# Patient Record
Sex: FEMALE | Race: WHITE | Employment: OTHER | URBAN - METROPOLITAN AREA
[De-identification: names, ages, dates, MRNs, and addresses within clinical notes are randomized per-mention and may not be internally consistent; named-entity substitution may affect disease eponyms.]

---

## 2017-04-28 ENCOUNTER — TRANSFERRED RECORDS (OUTPATIENT)
Dept: HEALTH INFORMATION MANAGEMENT | Facility: CLINIC | Age: 57
End: 2017-04-28

## 2017-09-18 ENCOUNTER — OFFICE VISIT (OUTPATIENT)
Dept: FAMILY MEDICINE | Facility: CLINIC | Age: 57
End: 2017-09-18
Payer: COMMERCIAL

## 2017-09-18 VITALS
WEIGHT: 208.4 LBS | SYSTOLIC BLOOD PRESSURE: 131 MMHG | HEIGHT: 68 IN | HEART RATE: 72 BPM | TEMPERATURE: 98.2 F | BODY MASS INDEX: 31.58 KG/M2 | OXYGEN SATURATION: 97 % | DIASTOLIC BLOOD PRESSURE: 89 MMHG

## 2017-09-18 DIAGNOSIS — E11.9 TYPE 2 DIABETES MELLITUS WITHOUT COMPLICATION, WITHOUT LONG-TERM CURRENT USE OF INSULIN (H): Primary | ICD-10-CM

## 2017-09-18 DIAGNOSIS — K63.5 POLYP OF COLON, UNSPECIFIED PART OF COLON, UNSPECIFIED TYPE: ICD-10-CM

## 2017-09-18 DIAGNOSIS — E78.5 HYPERLIPIDEMIA LDL GOAL <100: ICD-10-CM

## 2017-09-18 LAB
ALBUMIN SERPL-MCNC: 4 G/DL (ref 3.4–5)
ALP SERPL-CCNC: 76 U/L (ref 40–150)
ALT SERPL W P-5'-P-CCNC: 32 U/L (ref 0–50)
ANION GAP SERPL CALCULATED.3IONS-SCNC: 11 MMOL/L (ref 3–14)
AST SERPL W P-5'-P-CCNC: 18 U/L (ref 0–45)
BILIRUB SERPL-MCNC: 0.4 MG/DL (ref 0.2–1.3)
BUN SERPL-MCNC: 14 MG/DL (ref 7–30)
CALCIUM SERPL-MCNC: 9.2 MG/DL (ref 8.5–10.1)
CHLORIDE SERPL-SCNC: 106 MMOL/L (ref 94–109)
CHOLEST SERPL-MCNC: 230 MG/DL
CO2 SERPL-SCNC: 25 MMOL/L (ref 20–32)
CREAT SERPL-MCNC: 0.66 MG/DL (ref 0.52–1.04)
CREAT UR-MCNC: 153 MG/DL
GFR SERPL CREATININE-BSD FRML MDRD: >90 ML/MIN/1.7M2
GLUCOSE SERPL-MCNC: 114 MG/DL (ref 70–99)
HBA1C MFR BLD: 6 % (ref 4.3–6)
HDLC SERPL-MCNC: 48 MG/DL
LDLC SERPL CALC-MCNC: 142 MG/DL
MICROALBUMIN UR-MCNC: 12 MG/L
MICROALBUMIN/CREAT UR: 7.78 MG/G CR (ref 0–25)
NONHDLC SERPL-MCNC: 182 MG/DL
POTASSIUM SERPL-SCNC: 4.4 MMOL/L (ref 3.4–5.3)
PROT SERPL-MCNC: 7.3 G/DL (ref 6.8–8.8)
SODIUM SERPL-SCNC: 142 MMOL/L (ref 133–144)
TRIGL SERPL-MCNC: 200 MG/DL
TSH SERPL DL<=0.005 MIU/L-ACNC: 2.21 MU/L (ref 0.4–4)

## 2017-09-18 PROCEDURE — 99214 OFFICE O/P EST MOD 30 MIN: CPT | Performed by: FAMILY MEDICINE

## 2017-09-18 PROCEDURE — 80053 COMPREHEN METABOLIC PANEL: CPT | Performed by: FAMILY MEDICINE

## 2017-09-18 PROCEDURE — 83036 HEMOGLOBIN GLYCOSYLATED A1C: CPT | Performed by: FAMILY MEDICINE

## 2017-09-18 PROCEDURE — 36415 COLL VENOUS BLD VENIPUNCTURE: CPT | Performed by: FAMILY MEDICINE

## 2017-09-18 PROCEDURE — 82043 UR ALBUMIN QUANTITATIVE: CPT | Performed by: FAMILY MEDICINE

## 2017-09-18 PROCEDURE — 80061 LIPID PANEL: CPT | Performed by: FAMILY MEDICINE

## 2017-09-18 PROCEDURE — 84443 ASSAY THYROID STIM HORMONE: CPT | Performed by: FAMILY MEDICINE

## 2017-09-18 NOTE — LETTER
September 20, 2017      Vannessa Moyer  [address] [facility] [address]        Dear ,        I have reviewed your recent labs. Here are the results:     -Liver and gallbladder tests (ALT,AST, Alk phos,bilirubin) are normal.   -Kidney function (GFR) is normal.   -Sodium is normal.   -Potassium is normal.       -LDL(bad) cholesterol level is elevated, HDL(good) cholesterol level is low and your triglycerides are elevated which can increase your heart disease risk.  A diet high in fat and simple carbohydrates, genetics and being overweight can contribute to this. ADVISE: Exercise, a low fat, low carbohydrate diet, weight control, and omega-3 fatty acids (fish oil) 1615-8158 mg daily are helpful to improve this. Rechecking your fasting cholesterol panel in 6 months is recommended.    LDL is above goal, will send low dose of cholesterol medication called Lipitor 10 mg. Please start taking along with metformin.     -TSH (thyroid stimulating hormone) level is normal which indicates normal thyroid function.     -A1C (test of diabetes control the last 2-3 months) is at your goal. Please continue with current plan. Also, see me and recheck your A1C test in 6 months.     -Microalbumin (urine protein) test is normal.  ADVISE: recheck annually     Resulted Orders   Lipid panel reflex to direct LDL   Result Value Ref Range    Cholesterol 230 (H) <200 mg/dL      Comment:      Desirable:       <200 mg/dl    Triglycerides 200 (H) <150 mg/dL      Comment:      Borderline high:  150-199 mg/dl  High:             200-499 mg/dl  Very high:       >499 mg/dl  Fasting specimen      HDL Cholesterol 48 (L) >49 mg/dL    LDL Cholesterol Calculated 142 (H) <100 mg/dL      Comment:      Above desirable:  100-129 mg/dl  Borderline High:  130-159 mg/dL  High:             160-189 mg/dL  Very high:       >189 mg/dl      Non HDL Cholesterol 182 (H) <130 mg/dL      Comment:      Above Desirable:  130-159 mg/dl  Borderline high:   160-189 mg/dl  High:             190-219 mg/dl  Very high:       >219 mg/dl     Hemoglobin A1c   Result Value Ref Range    Hemoglobin A1C 6.0 4.3 - 6.0 %   Comprehensive metabolic panel   Result Value Ref Range    Sodium 142 133 - 144 mmol/L    Potassium 4.4 3.4 - 5.3 mmol/L    Chloride 106 94 - 109 mmol/L    Carbon Dioxide 25 20 - 32 mmol/L    Anion Gap 11 3 - 14 mmol/L    Glucose 114 (H) 70 - 99 mg/dL      Comment:      Fasting specimen    Urea Nitrogen 14 7 - 30 mg/dL    Creatinine 0.66 0.52 - 1.04 mg/dL    GFR Estimate >90 >60 mL/min/1.7m2      Comment:      Non  GFR Calc    GFR Estimate If Black >90 >60 mL/min/1.7m2      Comment:       GFR Calc    Calcium 9.2 8.5 - 10.1 mg/dL    Bilirubin Total 0.4 0.2 - 1.3 mg/dL    Albumin 4.0 3.4 - 5.0 g/dL    Protein Total 7.3 6.8 - 8.8 g/dL    Alkaline Phosphatase 76 40 - 150 U/L    ALT 32 0 - 50 U/L    AST 18 0 - 45 U/L   TSH   Result Value Ref Range    TSH 2.21 0.40 - 4.00 mU/L   Albumin Random Urine Quantitative with Creat Ratio   Result Value Ref Range    Creatinine Urine 153 mg/dL    Albumin Urine mg/L 12 mg/L    Albumin Urine mg/g Cr 7.78 0 - 25 mg/g Cr       If you have any questions or concerns, please call the clinic at the number listed above.       Sincerely,    Tesfaye Mason MD

## 2017-09-18 NOTE — PROGRESS NOTES
SUBJECTIVE:   Vannessa Moyer is a 57 year old female who presents to clinic today for the following health issues:      Diabetes Follow-up    Patient is checking blood sugars: once daily.  Results are as follows:       am - [phone]  She has been busy at her work, not doing regular exercise.  No other symptoms, no chest pain or shortness of breath.        Diabetic concerns: None     Symptoms of hypoglycemia (low blood sugar): none     Paresthesias (numbness or burning in feet) or sores: No   Date of last diabetic eye exam: pt is due       Amount of exercise or physical activity: 2-3 days/week for an average of 45-60 minutes    Problems taking medications regularly: No    Medication side effects: none  Diet: carbohydrate counting          Problem list and histories reviewed & adjusted, as indicated.  Additional history: as documented    Patient Active Problem List   Diagnosis     Pernicious anemia     Chronic rhinitis     Colon polyps     Hyperlipidemia LDL goal <100     Type 2 diabetes mellitus without complication, without long-term current use of insulin (H)     Past Surgical History:   Procedure Laterality Date     C LASIK (EMP) W OPT MYOPIA -6.50  2000     COLONOSCOPY  4/2014    4 polyps, repeat in 3 years     HC REMOVAL OF TONSILS,<12 Y/O  1970    Tonsils <13y.o.     HC REPAIR OF HAMMERTOE,ONE  12/2005       Social History   Substance Use Topics     Smoking status: Never Smoker     Smokeless tobacco: Never Used     Alcohol use 0.0 oz/week     0 Standard drinks or equivalent per week     Family History   Problem Relation Age of Onset     Neurologic Disorder Mother      parkinsons     OSTEOPOROSIS Mother      Thyroid Disease Mother      CEREBROVASCULAR DISEASE Father      C.A.D. Father      quadruple bypass     Hypertension Father      CEREBROVASCULAR DISEASE Maternal Grandmother      CEREBROVASCULAR DISEASE Paternal Grandmother      Circulatory Paternal Grandmother      CEREBROVASCULAR DISEASE Paternal  "Grandfather      Hypertension Paternal Grandfather      CEREBROVASCULAR DISEASE Brother      passed away from [facility]     DIABETES Brother      Thyroid Disease Sister          Current Outpatient Prescriptions   Medication Sig Dispense Refill     metFORMIN (GLUCOPHAGE) 500 MG tablet Take 1 tablet (500 mg) by mouth 2 times daily (with meals) 180 tablet 3     blood glucose monitoring (BRIT CONTOUR NEXT) test strip by In Vitro route 3 times daily Use to test blood sugar 3 times daily or as directed. 100 each 3     blood glucose monitoring (BRIT MICROLET) lancets Use to test blood sugar 3 times daily or as directed. 1 Box prn     blood glucose monitoring (BRIT CONTOUR NEXT USB MONITOR) meter device kit Use to test blood sugar 2  times daily or as directed. 1 kit 0     ACE NOT PRESCRIBED, INTENTIONAL, 1 each as needed for other ACE Inhibitor not prescribed due to Other:BP is normal Microalbumin is normal 1 each 0     aspirin 81 MG tablet Take 1 tablet (81 mg) by mouth daily 30 tablet 11     MULTIVITAMIN TABS   OR 1 tablet daily 0 0     CALCIUM 600 MG OR TABS bid 0 0     FISH OIL CONCENTRATE 1000 MG OR CAPS 1 tablet daily 0 0     [DISCONTINUED] metFORMIN (GLUCOPHAGE) 500 MG tablet Take 1 tablet (500 mg) by mouth 2 times daily (with meals) 180 tablet 3         Reviewed and updated as needed this visit by clinical staffTobacco  Allergies  Meds  Med Hx  Surg Hx  Fam Hx  Soc Hx      Reviewed and updated as needed this visit by Provider         ROS:  C: NEGATIVE for fever, chills, change in weight  E/M: NEGATIVE for ear, mouth and throat problems  R: NEGATIVE for significant cough or SOB  CV: NEGATIVE for chest pain, palpitations or peripheral edema    OBJECTIVE:                                                    /89 (BP Location: Right arm, Patient Position: Sitting, Cuff Size: Adult Large)  Pulse 72  Temp 98.2  F (36.8  C) (Tympanic)  Ht 5' 8\" (1.727 m)  Wt 208 lb 6.4 oz (94.5 kg)  SpO2 97%  BMI 31.69 " kg/m2  Body mass index is 31.69 kg/(m^2).   GENERAL: healthy, alert, well nourished, well hydrated, no distress  HENT: ear canals- normal; TMs- normal; Nose- normal; Mouth- no ulcers, no lesions  NECK: no tenderness, no adenopathy, no asymmetry, no masses, no stiffness; thyroid- normal to palpation  RESP: lungs clear to auscultation - no rales, no rhonchi, no wheezes  CV: regular rates and rhythm, normal S1 S2, no S3 or S4 and no murmur, no click or rub -  ABDOMEN: soft, no tenderness, no  hepatosplenomegaly, no masses, normal bowel sounds       ASSESSMENT/PLAN:                                                        ICD-10-CM    1. Type 2 diabetes mellitus without complication, without long-term current use of insulin (H) E11.9 Hemoglobin A1c     Comprehensive metabolic panel     TSH     Albumin Random Urine Quantitative with Creat Ratio     metFORMIN (GLUCOPHAGE) 500 MG tablet   2. Hyperlipidemia LDL goal <100 E78.5 Lipid panel reflex to direct LDL     Comprehensive metabolic panel   3. Polyp of colon, unspecified part of colon, unspecified type K63.5        Patient AIC is stable, we will refill the medications. Labs ordered will follow up on that.  Advice healthy diet and regular exercise.  BP is stable.  Follow up in [id number] for physical.    Tesfaye Mason MD  [facility]

## 2017-09-18 NOTE — NURSING NOTE
"Chief Complaint   Patient presents with     Diabetes       Initial There were no vitals taken for this visit. Estimated body mass index is 30.87 kg/(m^2) as calculated from the following:    Height as of 10/7/16: 5' 8\" (1.727 m).    Weight as of 10/7/16: 203 lb (92.1 kg).  Medication Reconciliation: complete   Lanette Guerrero CMA    "

## 2017-09-18 NOTE — MR AVS SNAPSHOT
After Visit Summary   9/18/2017    Vannessa Moyer    MRN: [id number]           Patient Information     Date Of Birth          1960        Visit Information        Provider Department      9/18/2017 9:00 AM Tesfaye Mason MD [facility] [place]        Today's Diagnoses     Type 2 diabetes mellitus without complication, without long-term current use of insulin (H)    -  1    Hyperlipidemia LDL goal <100        Polyp of colon, unspecified part of colon, unspecified type           Follow-ups after your visit        Who to contact     If you have questions or need follow up information about today's clinic visit or your schedule please contact [facility] [place] directly at [phone].  Normal or non-critical lab and imaging results will be communicated to you by Eye Phonehart, letter or phone within 4 business days after the clinic has received the results. If you do not hear from us within 7 days, please contact the clinic through Eye Phonehart or phone. If you have a critical or abnormal lab result, we will notify you by phone as soon as possible.  Submit refill requests through [employer] or call your pharmacy and they will forward the refill request to us. Please allow 3 business days for your refill to be completed.          Additional Information About Your Visit        MyChart Information     [employer] gives you secure access to your electronic health record. If you see a primary care provider, you can also send messages to your care team and make appointments. If you have questions, please call your primary care clinic.  If you do not have a primary care provider, please call [phone] and they will assist you.        Care EveryWhere ID     This is your Care EveryWhere ID. This could be used by other organizations to access your [facility] medical records  [id number]        Your Vitals Were     Pulse Temperature Height Pulse Oximetry BMI (Body Mass Index)       72 98.2  F (36.8  C)  "(Tympanic) 5' 8\" (1.727 m) 97% 31.69 kg/m2        Blood Pressure from Last 3 Encounters:   09/18/17 131/89   10/07/16 122/68   04/05/16 126/72    Weight from Last 3 Encounters:   09/18/17 208 lb 6.4 oz (94.5 kg)   10/07/16 203 lb (92.1 kg)   04/05/16 186 lb (84.4 kg)              We Performed the Following     Albumin Random Urine Quantitative with Creat Ratio     Comprehensive metabolic panel     Hemoglobin A1c     Lipid panel reflex to direct LDL     TSH          Where to get your medicines      These medications were sent to [facility] - [place], [place] - [address] AT [facility]  [address] [place] [address], [facility] [address]     Phone:  [phone]     metFORMIN 500 MG tablet          Primary Care Provider Office Phone # Fax #    Tesfaye Mason [phone]       [address]  [place] [address]        Equal Access to Services     [facility]: Hadii aad ku hadasho Soomaali, waaxda luqadaha, qaybta kaalmada adeegyada, waxay sadiein hayliane turcios . So [facility] [phone].    ATENCIÓN: Si habla español, tiene a multani disposición servicios gratuitos de asistencia lingüística. Llame al [phone].    We comply with applicable federal civil rights laws and [place] laws. We do not discriminate on the basis of race, color, national origin, age, disability sex, sexual orientation or gender identity.            Thank you!     Thank you for choosing [facility] [place]  for your care. Our goal is always to provide you with excellent care. Hearing back from our patients is one way we can continue to improve our services. Please take a few minutes to complete the written survey that you may receive in the mail after your visit with us. Thank you!             Your Updated Medication List - Protect others around you: Learn how to safely use, store and throw away your medicines at www.disposemymeds.org.          This list is accurate as of: " 9/18/17  9:59 AM.  Always use your most recent med list.                   Brand Name Dispense Instructions for use Diagnosis    ACE NOT PRESCRIBED (INTENTIONAL)     1 each    1 each as needed for other ACE Inhibitor not prescribed due to Other:BP is normal Microalbumin is normal    Diabetes mellitus, type 2 (H)       aspirin 81 MG tablet     30 tablet    Take 1 tablet (81 mg) by mouth daily    CARDIOVASCULAR SCREENING; LDL GOAL LESS THAN 130, Elevated blood sugar, Diabetes mellitus, type 2 (H)       blood glucose monitoring lancets     1 Box    Use to test blood sugar 3 times daily or as directed.    Type 2 diabetes mellitus without complications (H)       blood glucose monitoring meter device kit     1 kit    Use to test blood sugar 2  times daily or as directed.    Diabetes mellitus, type 2 (H)       blood glucose monitoring test strip    BRIT CONTOUR NEXT    100 each    by In Vitro route 3 times daily Use to test blood sugar 3 times daily or as directed.    Type 2 diabetes mellitus without complications (H)       calcium 600 MG tablet     0    bid        FISH OIL CONCENTRATE 1000 MG Caps     0    1 tablet daily        metFORMIN 500 MG tablet    GLUCOPHAGE    180 tablet    Take 1 tablet (500 mg) by mouth 2 times daily (with meals)    Type 2 diabetes mellitus without complication, without long-term current use of insulin (H)       MULTIVITAMIN TABS   OR     0    1 tablet daily

## 2017-09-19 RX ORDER — ATORVASTATIN CALCIUM 10 MG/1
10 TABLET, FILM COATED ORAL DAILY
Qty: 90 TABLET | Refills: 3 | Status: SHIPPED | OUTPATIENT
Start: 2017-09-19 | End: 2018-08-21

## 2018-04-16 ENCOUNTER — TRANSFERRED RECORDS (OUTPATIENT)
Dept: HEALTH INFORMATION MANAGEMENT | Facility: CLINIC | Age: 58
End: 2018-04-16

## 2018-08-06 DIAGNOSIS — E78.5 HYPERLIPIDEMIA LDL GOAL <100: ICD-10-CM

## 2018-08-06 DIAGNOSIS — E11.9 TYPE 2 DIABETES MELLITUS WITHOUT COMPLICATION, WITHOUT LONG-TERM CURRENT USE OF INSULIN (H): ICD-10-CM

## 2018-08-06 RX ORDER — ATORVASTATIN CALCIUM 10 MG/1
10 TABLET, FILM COATED ORAL DAILY
Qty: 90 TABLET | Refills: 3 | Status: CANCELLED | OUTPATIENT
Start: 2018-08-06

## 2018-08-06 NOTE — TELEPHONE ENCOUNTER
Patient is overdue for DM visit.  She read her [employer] message sent by Dona on 5/15/18 but has not scheduled.  Is she still a [facility] patient?    Left message for patient to call triage back  Joyce Madlonado RN- Triage [employer]

## 2018-08-06 NOTE — TELEPHONE ENCOUNTER
"Requested Prescriptions   Pending Prescriptions Disp Refills     metFORMIN (GLUCOPHAGE) 500 MG tablet  Last Written Prescription Date:  9/18/17  Last Fill Quantity: 180,  # refills: 3   Last office visit: 9/18/2017 with prescribing provider:  Marlon   Future Office Visit:     180 tablet 3     Sig: Take 1 tablet (500 mg) by mouth 2 times daily (with meals)    Biguanide Agents Failed    8/6/2018 11:36 AM       Failed - Blood pressure less than 140/90 in past 6 months    BP Readings from Last 3 Encounters:   09/18/17 131/89   10/07/16 122/68   04/05/16 126/72                Failed - Patient has documented A1c within the specified period of time.    If HgbA1C is 8 or greater, it needs to be on file within the past 3 months.  If less than 8, must be on file within the past 6 months.     Recent Labs   Lab Test  09/18/17   0937   A1C  6.0            Failed - Recent (6 mo) or future (30 days) visit within the authorizing provider's specialty    Patient had office visit in the last 6 months or has a visit in the next 30 days with authorizing provider or within the authorizing provider's specialty.  See \"Patient Info\" tab in inbasket, or \"Choose Columns\" in Meds & Orders section of the refill encounter.           Passed - Patient has documented LDL within the past 12 mos.    Recent Labs   Lab Test  09/18/17   0937   LDL  142*            Passed - Patient has had a Microalbumin in the past 12 mos.    Recent Labs   Lab Test  09/18/17   0945   MICROL  12   UMALCR  7.78            Passed - Patient is age 10 or older       Passed - Patient's CR is NOT>1.4 OR Patient's EGFR is NOT<45 within past 12 mos.    Recent Labs   Lab Test  09/18/17   0937   GFRESTIMATED  >90   GFRESTBLACK  >90       Recent Labs   Lab Test  09/18/17   0937   CR  0.66            Passed - Patient does NOT have a diagnosis of CHF.       Passed - Patient is not pregnant       Passed - Patient has not had a positive pregnancy test within the past 12 mos.         " "atorvastatin (LIPITOR) 10 MG tablet  Last Written Prescription Date:  9/19/17  Last Fill Quantity: 90,  # refills: 3   Last office visit: 9/18/2017 with prescribing provider:  Marlon   Future Office Visit:     90 tablet 3     Sig: Take 1 tablet (10 mg) by mouth daily    Statins Protocol Passed    8/6/2018 11:36 AM       Passed - LDL on file in past 12 months    Recent Labs   Lab Test  09/18/17   0937   LDL  142*            Passed - No abnormal creatine kinase in past 12 months    No lab results found.            Passed - Recent (12 mo) or future (30 days) visit within the authorizing provider's specialty    Patient had office visit in the last 12 months or has a visit in the next 30 days with authorizing provider or within the authorizing provider's specialty.  See \"Patient Info\" tab in inbasket, or \"Choose Columns\" in Meds & Orders section of the refill encounter.           Passed - Patient is age 18 or older       Passed - No active pregnancy on record       Passed - No positive pregnancy test in past 12 months          "

## 2018-08-07 NOTE — TELEPHONE ENCOUNTER
Spoke with patient and OV was advised. She scheduled appointment on 8/21/18. She states she does not need a refill on atorvastatin but needs a refill of metformin. This was sent into the pharmacy for her.   Edie Page RN   [facility] - Triage

## 2018-08-09 NOTE — TELEPHONE ENCOUNTER
[place] pharmacy calling to request verbal okay for Metformin as they did not received fax.  Verbal order given over the phone.    JAY Merrill, RN, PHN  [facility]) [phone]

## 2018-08-13 ENCOUNTER — TELEPHONE (OUTPATIENT)
Dept: FAMILY MEDICINE | Facility: CLINIC | Age: 58
End: 2018-08-13

## 2018-08-13 DIAGNOSIS — E11.9 TYPE 2 DIABETES MELLITUS WITHOUT COMPLICATION, WITHOUT LONG-TERM CURRENT USE OF INSULIN (H): ICD-10-CM

## 2018-08-13 NOTE — TELEPHONE ENCOUNTER
Reason for Call: prescription    Detailed comments: Pt called and request the metFORMIN (GLUCOPHAGE) 500 MG tablet to be send to [facility] due to Ins coverage, pls call pt when it is ready     Phone Number Patient can be reached at: Cell number on file:    Telephone Information:   Mobile [phone]       Best Time: anytime     Can we leave a detailed message on this number? YES    Call taken on 8/13/2018 at 10:11 AM by CASEY MYLES

## 2018-08-13 NOTE — TELEPHONE ENCOUNTER
Patient wants script sent here.  Previous pharmacy did not take insurance.  Teresa Pantoja RN - Triage  [facility]

## 2018-08-21 ENCOUNTER — OFFICE VISIT (OUTPATIENT)
Dept: FAMILY MEDICINE | Facility: CLINIC | Age: 58
End: 2018-08-21
Payer: COMMERCIAL

## 2018-08-21 VITALS
WEIGHT: 211 LBS | SYSTOLIC BLOOD PRESSURE: 130 MMHG | BODY MASS INDEX: 31.98 KG/M2 | HEART RATE: 70 BPM | DIASTOLIC BLOOD PRESSURE: 80 MMHG | HEIGHT: 68 IN | TEMPERATURE: 96.5 F

## 2018-08-21 DIAGNOSIS — E78.5 HYPERLIPIDEMIA LDL GOAL <100: ICD-10-CM

## 2018-08-21 DIAGNOSIS — E11.9 TYPE 2 DIABETES MELLITUS WITHOUT COMPLICATION, WITHOUT LONG-TERM CURRENT USE OF INSULIN (H): Primary | ICD-10-CM

## 2018-08-21 DIAGNOSIS — L98.9 SKIN LESION: ICD-10-CM

## 2018-08-21 LAB
ALT SERPL W P-5'-P-CCNC: 46 U/L (ref 0–50)
ANION GAP SERPL CALCULATED.3IONS-SCNC: 9 MMOL/L (ref 3–14)
AST SERPL W P-5'-P-CCNC: 27 U/L (ref 0–45)
BUN SERPL-MCNC: 15 MG/DL (ref 7–30)
CALCIUM SERPL-MCNC: 9.3 MG/DL (ref 8.5–10.1)
CHLORIDE SERPL-SCNC: 106 MMOL/L (ref 94–109)
CHOLEST SERPL-MCNC: 164 MG/DL
CO2 SERPL-SCNC: 27 MMOL/L (ref 20–32)
CREAT SERPL-MCNC: 0.66 MG/DL (ref 0.52–1.04)
CREAT UR-MCNC: 122 MG/DL
GFR SERPL CREATININE-BSD FRML MDRD: >90 ML/MIN/1.7M2
GLUCOSE SERPL-MCNC: 145 MG/DL (ref 70–99)
HBA1C MFR BLD: 6.9 % (ref 0–5.6)
HDLC SERPL-MCNC: 41 MG/DL
LDLC SERPL CALC-MCNC: 84 MG/DL
MICROALBUMIN UR-MCNC: 10 MG/L
MICROALBUMIN/CREAT UR: 8.44 MG/G CR (ref 0–25)
NONHDLC SERPL-MCNC: 123 MG/DL
POTASSIUM SERPL-SCNC: 4.7 MMOL/L (ref 3.4–5.3)
SODIUM SERPL-SCNC: 142 MMOL/L (ref 133–144)
TRIGL SERPL-MCNC: 197 MG/DL

## 2018-08-21 PROCEDURE — 99214 OFFICE O/P EST MOD 30 MIN: CPT | Performed by: INTERNAL MEDICINE

## 2018-08-21 PROCEDURE — 80061 LIPID PANEL: CPT | Performed by: INTERNAL MEDICINE

## 2018-08-21 PROCEDURE — 84460 ALANINE AMINO (ALT) (SGPT): CPT | Performed by: INTERNAL MEDICINE

## 2018-08-21 PROCEDURE — 84450 TRANSFERASE (AST) (SGOT): CPT | Performed by: INTERNAL MEDICINE

## 2018-08-21 PROCEDURE — 99207 C FOOT EXAM  NO CHARGE: CPT | Performed by: INTERNAL MEDICINE

## 2018-08-21 PROCEDURE — 36415 COLL VENOUS BLD VENIPUNCTURE: CPT | Performed by: INTERNAL MEDICINE

## 2018-08-21 PROCEDURE — 82043 UR ALBUMIN QUANTITATIVE: CPT | Performed by: INTERNAL MEDICINE

## 2018-08-21 PROCEDURE — 80048 BASIC METABOLIC PNL TOTAL CA: CPT | Performed by: INTERNAL MEDICINE

## 2018-08-21 PROCEDURE — 83036 HEMOGLOBIN GLYCOSYLATED A1C: CPT | Performed by: INTERNAL MEDICINE

## 2018-08-21 RX ORDER — ATORVASTATIN CALCIUM 10 MG/1
10 TABLET, FILM COATED ORAL DAILY
Qty: 90 TABLET | Refills: 3 | Status: SHIPPED | OUTPATIENT
Start: 2018-08-21 | End: 2019-09-03

## 2018-08-21 RX ORDER — BETAMETHASONE DIPROPIONATE 0.5 MG/G
CREAM TOPICAL
Qty: 15 G | Refills: 1 | Status: SHIPPED | OUTPATIENT
Start: 2018-08-21

## 2018-08-21 NOTE — MR AVS SNAPSHOT
After Visit Summary   8/21/2018    Vannessa Moyer    MRN: [id number]           Patient Information     Date Of Birth          1960        Visit Information        Provider Department      8/21/2018 9:00 AM Chel Seals MD [facility] [place]        Today's Diagnoses     Type 2 diabetes mellitus without complication, without long-term current use of insulin (H)    -  1    Hyperlipidemia LDL goal <100           Follow-ups after your visit        Follow-up notes from your care team     Return in about 6 months (around 2/21/2019) for Routine Visit.      Who to contact     If you have questions or need follow up information about today's clinic visit or your schedule please contact [facility] [place] directly at [phone].  Normal or non-critical lab and imaging results will be communicated to you by MyChart, letter or phone within 4 business days after the clinic has received the results. If you do not hear from us within 7 days, please contact the clinic through MyChart or phone. If you have a critical or abnormal lab result, we will notify you by phone as soon as possible.  Submit refill requests through [employer] or call your pharmacy and they will forward the refill request to us. Please allow 3 business days for your refill to be completed.          Additional Information About Your Visit        MyChart Information     [employer] gives you secure access to your electronic health record. If you see a primary care provider, you can also send messages to your care team and make appointments. If you have questions, please call your primary care clinic.  If you do not have a primary care provider, please call [phone] and they will assist you.        Care EveryWhere ID     This is your Care EveryWhere ID. This could be used by other organizations to access your [facility] medical records  [id number]        Your Vitals Were     Pulse Temperature Height BMI (Body Mass  "Index)          70 96.5  F (35.8  C) (Tympanic) 5' 8\" (1.727 m) 32.08 kg/m2         Blood Pressure from Last 3 Encounters:   08/21/18 130/80   09/18/17 131/89   10/07/16 122/68    Weight from Last 3 Encounters:   08/21/18 211 lb (95.7 kg)   09/18/17 208 lb 6.4 oz (94.5 kg)   10/07/16 203 lb (92.1 kg)              We Performed the Following     Albumin Random Urine Quantitative with Creat Ratio     Basic metabolic panel  (Ca, Cl, CO2, Creat, Gluc, K, Na, BUN)     Hemoglobin A1c     Lipid panel reflex to direct LDL Fasting          Today's Medication Changes          These changes are accurate as of 8/21/18  9:26 AM.  If you have any questions, ask your nurse or doctor.               These medicines have changed or have updated prescriptions.        Dose/Directions    metFORMIN 1000 MG tablet   Commonly known as:  GLUCOPHAGE   This may have changed:    - medication strength  - how much to take   Used for:  Type 2 diabetes mellitus without complication, without long-term current use of insulin (H)   Changed by:  Chel Seals MD        Dose:  1000 mg   Take 1 tablet (1,000 mg) by mouth 2 times daily (with meals)   Quantity:  180 tablet   Refills:  1            Where to get your medicines      These medications were sent to [id number] - [place] - [address]  [address] [place] [address]     Phone:  [phone]     atorvastatin 10 MG tablet    metFORMIN 1000 MG tablet                Primary Care Provider Office Phone # Fax #    Tesfaye Mason [phone]       [address]  [place] [address]        Equal Access to Services     [facility] ESTRADA AH: Hadii scott Sutton, wagabrielda luqadaha, qaybta kaalmada brandi, kim allan. So [facility] [place] [phone].    ATENCIÓN: Si habla español, tiene a multani disposición servicios gratuitos de asistencia lingüística. Llame al [phone].    We comply with applicable federal " civil rights laws and [place] laws. We do not discriminate on the basis of race, color, national origin, age, disability, sex, sexual orientation, or gender identity.            Thank you!     Thank you for choosing [facility] [place]  for your care. Our goal is always to provide you with excellent care. Hearing back from our patients is one way we can continue to improve our services. Please take a few minutes to complete the written survey that you may receive in the mail after your visit with us. Thank you!             Your Updated Medication List - Protect others around you: Learn how to safely use, store and throw away your medicines at www.disposemymeds.org.          This list is accurate as of 8/21/18  9:26 AM.  Always use your most recent med list.                   Brand Name Dispense Instructions for use Diagnosis    ACE NOT PRESCRIBED (INTENTIONAL)     1 each    1 each as needed for other ACE Inhibitor not prescribed due to Other:BP is normal Microalbumin is normal    Diabetes mellitus, type 2 (H)       aspirin 81 MG tablet     30 tablet    Take 1 tablet (81 mg) by mouth daily    CARDIOVASCULAR SCREENING; LDL GOAL LESS THAN 130, Elevated blood sugar, Diabetes mellitus, type 2 (H)       atorvastatin 10 MG tablet    LIPITOR    90 tablet    Take 1 tablet (10 mg) by mouth daily    Hyperlipidemia LDL goal <100       blood glucose monitoring lancets     100 each    Use to test blood sugar 3 times daily or as directed.    Type 2 diabetes mellitus without complications (H)       blood glucose monitoring meter device kit     1 kit    Use to test blood sugar 2  times daily or as directed.    Diabetes mellitus, type 2 (H)       blood glucose monitoring test strip    BRIT CONTOUR NEXT    100 each    100 strips by In Vitro route 3 times daily Use to test blood sugar 3 times daily or as directed.    Type 2 diabetes mellitus without complication, without long-term current use of insulin (H)       calcium  600 MG tablet     0    bid        FISH OIL CONCENTRATE 1000 MG Caps     0    1 tablet daily        metFORMIN 1000 MG tablet    GLUCOPHAGE    180 tablet    Take 1 tablet (1,000 mg) by mouth 2 times daily (with meals)    Type 2 diabetes mellitus without complication, without long-term current use of insulin (H)       MULTIVITAMIN TABS   OR     0    1 tablet daily        NONFORMULARY

## 2018-08-21 NOTE — PROGRESS NOTES
"  SUBJECTIVE:   Vannessa Moyer is a 58 year old female who presents to clinic today for the following health issues:        Diabetes Follow-up  Vannessa is here for diabetes.  Her fasting BGs have been 130s-140s, which has been up a little from prior.  She admits not having been as diligent with exercise and healthy diet recently due to business at work.  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   Patient is checking blood sugars: once daily.  Results are as follows:         am - before breakfast - 130s and 140s     Diabetic concerns: None and other - thickened skin on right foot, can be flaky and itchy      Symptoms of hypoglycemia (low blood sugar): none     Paresthesias (numbness or burning in feet) or sores: No     Date of last diabetic eye exam: 4/2018    BP Readings from Last 2 Encounters:   08/21/18 130/80   09/18/17 131/89     Hemoglobin A1C (%)   Date Value   08/21/2018 6.9 (H)   09/18/2017 6.0     LDL Cholesterol Calculated (mg/dL)   Date Value   09/18/2017 142 (H)   10/05/2016 112 (H)       Diabetes Management Resources    Amount of exercise or physical activity: 2-3 days/week for an average of 45-60 minutes    Problems taking medications regularly: No    Medication side effects: none    Diet: regular (no restrictions)        Reviewed and updated as needed this visit by clinical staff  Tobacco  Allergies  Meds       Reviewed and updated as needed this visit by Provider         ROS:  Const, CV, pulm, neuro, skin reviewed,  otherwise negative unless noted above.       OBJECTIVE:     /80 (BP Location: Right arm, Patient Position: Chair, Cuff Size: Adult Large)  Pulse 70  Temp 96.5  F (35.8  C) (Tympanic)  Ht 5' 8\" (1.727 m)  Wt 211 lb (95.7 kg)  BMI 32.08 kg/m2  Body mass index is 32.08 kg/(m^2).    Gen: well appearing, pleasant woman, no distress  Pulm: breathing comfortably, CTAB, no wheezes or rales  CV: RRR, normal S1 and S2, no murmurs  Ext: 2+ distal pulses, no LE edema, sensation intact to " monofilament b/l   Skin: hyperkeratotic lesion on dorsal right foot       Diagnostic Test Results:  Results for orders placed or performed in visit on 08/21/18 (from the past 24 hour(s))   Hemoglobin A1c   Result Value Ref Range    Hemoglobin A1C 6.9 (H) 0 - 5.6 %       ASSESSMENT/PLAN:         1. Type 2 diabetes mellitus without complication, without long-term current use of insulin (H)  Last a1 6.9% done today.   Medication regimen:  Increase metformin from 500mg BID to 1000mg BID   Blood pressure: normal without medications   Albuminuria: checking today   Statin: on atorvastatin   Eye exam: UTD  Foot exam: done today, normal   Lifestyle goal: get back into healthy diet   - Basic metabolic panel  (Ca, Cl, CO2, Creat, Gluc, K, Na, BUN)  - Lipid panel reflex to direct LDL Fasting  - Hemoglobin A1c  - Albumin Random Urine Quantitative with Creat Ratio  - metFORMIN (GLUCOPHAGE) 1000 MG tablet; Take 1 tablet (1,000 mg) by mouth 2 times daily (with meals)  Dispense: 180 tablet; Refill: 1  - FOOT EXAM    2. Hyperlipidemia LDL goal <100  - atorvastatin (LIPITOR) 10 MG tablet; Take 1 tablet (10 mg) by mouth daily  Dispense: 90 tablet; Refill: 3  - ALT  - AST    3. Skin lesion  Try topical steroid for itchy lesion. If doesn't improve, would see derm.   - betamethasone dipropionate (DIPROSONE) 0.05 % cream; Apply sparingly to affected area twice daily for up to 14 days.  Do not apply to face.  Dispense: 15 g; Refill: 1    F/U - 6 months     Chel Seals MD  [facility]

## 2019-03-04 ENCOUNTER — OFFICE VISIT (OUTPATIENT)
Dept: FAMILY MEDICINE | Facility: CLINIC | Age: 59
End: 2019-03-04
Payer: COMMERCIAL

## 2019-03-04 ENCOUNTER — HOSPITAL ENCOUNTER (OUTPATIENT)
Dept: MAMMOGRAPHY | Facility: CLINIC | Age: 59
Discharge: HOME | End: 2019-03-04
Attending: FAMILY MEDICINE | Admitting: FAMILY MEDICINE
Payer: COMMERCIAL

## 2019-03-04 VITALS
WEIGHT: 205 LBS | SYSTOLIC BLOOD PRESSURE: 108 MMHG | HEART RATE: 68 BPM | BODY MASS INDEX: 32.18 KG/M2 | HEIGHT: 67 IN | TEMPERATURE: 97.3 F | DIASTOLIC BLOOD PRESSURE: 74 MMHG

## 2019-03-04 DIAGNOSIS — Z12.31 VISIT FOR SCREENING MAMMOGRAM: ICD-10-CM

## 2019-03-04 DIAGNOSIS — E11.9 TYPE 2 DIABETES MELLITUS WITHOUT COMPLICATION, WITHOUT LONG-TERM CURRENT USE OF INSULIN (H): ICD-10-CM

## 2019-03-04 DIAGNOSIS — E78.5 HYPERLIPIDEMIA LDL GOAL <100: Primary | ICD-10-CM

## 2019-03-04 DIAGNOSIS — Z11.59 NEED FOR HEPATITIS C SCREENING TEST: ICD-10-CM

## 2019-03-04 DIAGNOSIS — Z00.00 ENCOUNTER FOR ANNUAL PHYSICAL EXAM: ICD-10-CM

## 2019-03-04 DIAGNOSIS — Z12.4 SCREENING FOR MALIGNANT NEOPLASM OF CERVIX: ICD-10-CM

## 2019-03-04 DIAGNOSIS — Z11.4 SCREENING FOR HIV (HUMAN IMMUNODEFICIENCY VIRUS): ICD-10-CM

## 2019-03-04 LAB
ALBUMIN SERPL-MCNC: 4.4 G/DL (ref 3.4–5)
ALP SERPL-CCNC: 85 U/L (ref 40–150)
ALT SERPL W P-5'-P-CCNC: 44 U/L (ref 0–50)
ANION GAP SERPL CALCULATED.3IONS-SCNC: 6 MMOL/L (ref 3–14)
AST SERPL W P-5'-P-CCNC: 28 U/L (ref 0–45)
BILIRUB SERPL-MCNC: 0.5 MG/DL (ref 0.2–1.3)
BUN SERPL-MCNC: 13 MG/DL (ref 7–30)
CALCIUM SERPL-MCNC: 9.8 MG/DL (ref 8.5–10.1)
CHLORIDE SERPL-SCNC: 104 MMOL/L (ref 94–109)
CHOLEST SERPL-MCNC: 169 MG/DL
CO2 SERPL-SCNC: 28 MMOL/L (ref 20–32)
CREAT SERPL-MCNC: 0.66 MG/DL (ref 0.52–1.04)
CREAT UR-MCNC: 120 MG/DL
GFR SERPL CREATININE-BSD FRML MDRD: >90 ML/MIN/{1.73_M2}
GLUCOSE SERPL-MCNC: 132 MG/DL (ref 70–99)
HBA1C MFR BLD: 6.5 % (ref 0–5.6)
HCV AB SERPL QL IA: NONREACTIVE
HDLC SERPL-MCNC: 47 MG/DL
HIV 1+2 AB+HIV1 P24 AG SERPL QL IA: NONREACTIVE
LDLC SERPL CALC-MCNC: 86 MG/DL
MICROALBUMIN UR-MCNC: 10 MG/L
MICROALBUMIN/CREAT UR: 8.13 MG/G CR (ref 0–25)
NONHDLC SERPL-MCNC: 122 MG/DL
POTASSIUM SERPL-SCNC: 4.1 MMOL/L (ref 3.4–5.3)
PROT SERPL-MCNC: 7.4 G/DL (ref 6.8–8.8)
SODIUM SERPL-SCNC: 138 MMOL/L (ref 133–144)
TRIGL SERPL-MCNC: 180 MG/DL

## 2019-03-04 PROCEDURE — 87624 HPV HI-RISK TYP POOLED RSLT: CPT | Performed by: FAMILY MEDICINE

## 2019-03-04 PROCEDURE — 87389 HIV-1 AG W/HIV-1&-2 AB AG IA: CPT | Performed by: FAMILY MEDICINE

## 2019-03-04 PROCEDURE — 77063 BREAST TOMOSYNTHESIS BI: CPT

## 2019-03-04 PROCEDURE — 83036 HEMOGLOBIN GLYCOSYLATED A1C: CPT | Performed by: FAMILY MEDICINE

## 2019-03-04 PROCEDURE — 82043 UR ALBUMIN QUANTITATIVE: CPT | Performed by: FAMILY MEDICINE

## 2019-03-04 PROCEDURE — 80053 COMPREHEN METABOLIC PANEL: CPT | Performed by: FAMILY MEDICINE

## 2019-03-04 PROCEDURE — 36415 COLL VENOUS BLD VENIPUNCTURE: CPT | Performed by: FAMILY MEDICINE

## 2019-03-04 PROCEDURE — 99396 PREV VISIT EST AGE 40-64: CPT | Performed by: FAMILY MEDICINE

## 2019-03-04 PROCEDURE — 80061 LIPID PANEL: CPT | Performed by: FAMILY MEDICINE

## 2019-03-04 PROCEDURE — G0145 SCR C/V CYTO,THINLAYER,RESCR: HCPCS | Performed by: FAMILY MEDICINE

## 2019-03-04 PROCEDURE — 99212 OFFICE O/P EST SF 10 MIN: CPT | Mod: 25 | Performed by: FAMILY MEDICINE

## 2019-03-04 PROCEDURE — 86803 HEPATITIS C AB TEST: CPT | Performed by: FAMILY MEDICINE

## 2019-03-04 ASSESSMENT — MIFFLIN-ST. JEOR: SCORE: 1547.5

## 2019-03-04 NOTE — PROGRESS NOTES
SUBJECTIVE:   CC: Vannessa Moyer is an 58 year old woman who presents for preventive health visit.     Healthy Habits:    Do you get at least three servings of calcium containing foods daily (dairy, green leafy vegetables, etc.)? yes    Amount of exercise or daily activities, outside of work: 5 days per week depending on the weather     Problems taking medications regularly No    Medication side effects: No    Have you had an eye exam in the past two years? yes    Do you see a dentist twice per year? yes    Do you have sleep apnea, excessive snoring or daytime drowsiness?no    Type 2 diabetes on metformin thousand milligrams twice daily.  Due for screening mammogram and Pap smear.  Anoscopy is due next year.  Denies any new symptoms.    Today's PHQ-2 Score:   PHQ-2 ( 1999 [employer]) 3/4/2019 9/18/2017   Q1: Little interest or pleasure in doing things 0 0   Q2: Feeling down, depressed or hopeless 0 0   PHQ-2 Score 0 0   Q1: Little interest or pleasure in doing things - -   Q2: Feeling down, depressed or hopeless - -   PHQ-2 Score - -       Abuse: Current or Past(Physical, Sexual or Emotional)- No  Do you feel safe in your environment? Yes    Social History     Tobacco Use     Smoking status: Never Smoker     Smokeless tobacco: Never Used   Substance Use Topics     Alcohol use: Yes     Alcohol/week: 0.0 oz     Comment: 2-3 glasses of wine weekly     If you drink alcohol do you typically have >3 drinks per day or >7 drinks per week? No                     Reviewed orders with patient.  Reviewed health maintenance and updated orders accordingly - Yes  Labs reviewed in EPIC  BP Readings from Last 3 Encounters:   03/04/19 108/74   08/21/18 130/80   09/18/17 131/89    Wt Readings from Last 3 Encounters:   03/04/19 93 kg (205 lb)   08/21/18 95.7 kg (211 lb)   09/18/17 94.5 kg (208 lb 6.4 oz)                    Mammogram Screening: Patient over age 50, mutual decision to screen reflected in health  "maintenance.    Pertinent mammograms are reviewed under the imaging tab.  History of abnormal Pap smear: NO - age 30- 65 PAP every 3 years recommended  PAP / HPV 4/10/2014 9/9/2005   PAP NIL NIL     Reviewed and updated as needed this visit by clinical staff  Tobacco  Allergies  Meds  Fam Hx  Soc Hx        Reviewed and updated as needed this visit by Provider            ROS:  CONSTITUTIONAL: NEGATIVE for fever, chills, change in weight  INTEGUMENTARY/SKIN: NEGATIVE for worrisome rashes, moles or lesions  EYES: NEGATIVE for vision changes or irritation  ENT: NEGATIVE for ear, mouth and throat problems  RESP: NEGATIVE for significant cough or SOB  BREAST: NEGATIVE for masses, tenderness or discharge  CV: NEGATIVE for chest pain, palpitations or peripheral edema  GI: NEGATIVE for nausea, abdominal pain, heartburn, or change in bowel habits  : NEGATIVE for unusual urinary or vaginal symptoms. No vaginal bleeding.  MUSCULOSKELETAL: NEGATIVE for significant arthralgias or myalgia  NEURO: NEGATIVE for weakness, dizziness or paresthesias  PSYCHIATRIC: NEGATIVE for changes in mood or affect     OBJECTIVE:   /74   Pulse 68   Temp 97.3  F (36.3  C) (Tympanic)   Ht 1.702 m (5' 7\")   Wt 93 kg (205 lb)   BMI 32.11 kg/m    EXAM:  GENERAL: healthy, alert and no distress  EYES: Eyes grossly normal to inspection, PERRL and conjunctivae and sclerae normal  HENT: ear canals and TM's normal, nose and mouth without ulcers or lesions  NECK: no adenopathy, no asymmetry, masses, or scars and thyroid normal to palpation  RESP: lungs clear to auscultation - no rales, rhonchi or wheezes  BREAST: normal without masses, tenderness or nipple discharge and no palpable axillary masses or adenopathy  CV: regular rate and rhythm, normal S1 S2, no S3 or S4, no murmur, click or rub, no peripheral edema and peripheral pulses strong  ABDOMEN: soft, nontender, no hepatosplenomegaly, no masses and bowel sounds normal   (female): normal " "female external genitalia, normal urethral meatus , vaginal mucosa pink, moist, well rugated and , cervical os is slight narrow,   MS: no gross musculoskeletal defects noted, no edema  SKIN: no suspicious lesions or rashes  NEURO: Normal strength and tone, mentation intact and speech normal  PSYCH: mentation appears normal, affect normal/bright        ASSESSMENT/PLAN:   1. Type 2 diabetes mellitus without complication, without long-term current use of insulin (H)  Labs ordered once done we will follow-up on that.  Hemoglobin A1c is better and stable.  She can continue metformin.  - HEMOGLOBIN A1C  - Comprehensive metabolic panel  - Lipid panel reflex to direct LDL Fasting  - Albumin Random Urine Quantitative with Creat Ratio    2. Screening for malignant neoplasm of cervix  Pap ordered once done we will follow-up on that.  - Pap imaged thin layer screen with HPV - recommended age 30 - 65 years (select HPV order below)    3. Need for hepatitis C screening test    - Hepatitis C Screen Reflex to HCV RNA Quant and Genotype    4. Screening for HIV (human immunodeficiency virus)    - HIV Screening    5. Hyperlipidemia LDL goal <100    - Comprehensive metabolic panel  - Lipid panel reflex to direct LDL Fasting    6. Encounter for annual physical exam        COUNSELING:   Reviewed preventive health counseling, as reflected in patient instructions       Healthy diet/nutrition       Vision screening    BP Readings from Last 1 Encounters:   03/04/19 108/74     Estimated body mass index is 32.11 kg/m  as calculated from the following:    Height as of this encounter: 1.702 m (5' 7\").    Weight as of this encounter: 93 kg (205 lb).           reports that  has never smoked. she has never used smokeless tobacco.      Counseling Resources:  ATP IV Guidelines  Pooled Cohorts Equation Calculator  Breast Cancer Risk Calculator  FRAX Risk Assessment  ICSI Preventive Guidelines  Dietary Guidelines for Americans, 2010  [employer]'s MyPlate  ASA " Prophylaxis  Lung CA Screening    Tesfaye Mason MD  [facility]

## 2019-03-04 NOTE — LETTER
March 7, 2019      Vannessa Moyer  [address] [address]        Dear ,      I have reviewed your recent labs. Here are the results:     -Cholesterol levels (LDL,HDL, Triglycerides) stable and slight improved.  ADVISE: rechecking in 1 year.   -Liver and gallbladder tests are normal (ALT,AST, Alk phos, bilirubin), kidney function is normal (Cr, GFR), sodium is normal, potassium is normal, calcium is normal.   -Hepatitis C antibody screen test shows no signs of a previous hepatitis C infection.   -HIV test is normal.   -Microalbumin (urine protein) test is normal.  ADVISE: rechecking this annually.   -3-month blood sugar is stable continue the same dose of medication.  Follow-up as suggested.       Resulted Orders   Hepatitis C Screen Reflex to HCV RNA Quant and Genotype   Result Value Ref Range    Hepatitis C Antibody Nonreactive NR^Nonreactive      Comment:      Assay performance characteristics have not been established for newborns,   infants, and children     HIV Screening   Result Value Ref Range    HIV Antigen Antibody Combo Nonreactive NR^Nonreactive          Comment:      HIV-1 p24 Ag & HIV-1/HIV-2 Ab Not Detected   HEMOGLOBIN A1C   Result Value Ref Range    Hemoglobin A1C 6.5 (H) 0 - 5.6 %      Comment:      Normal <5.7% Prediabetes 5.7-6.4%  Diabetes 6.5% or higher - adopted from ADA   consensus guidelines.     Comprehensive metabolic panel   Result Value Ref Range    Sodium 138 133 - 144 mmol/L    Potassium 4.1 3.4 - 5.3 mmol/L    Chloride 104 94 - 109 mmol/L    Carbon Dioxide 28 20 - 32 mmol/L    Anion Gap 6 3 - 14 mmol/L    Glucose 132 (H) 70 - 99 mg/dL      Comment:      Fasting specimen    Urea Nitrogen 13 7 - 30 mg/dL    Creatinine 0.66 0.52 - 1.04 mg/dL    GFR Estimate >90 >60 mL/min/[1.73_m2]      Comment:      Non  GFR Calc  Starting 12/18/2018, serum creatinine based estimated GFR (eGFR) will be   calculated using the Chronic Kidney Disease  Epidemiology Collaboration   (CKD-EPI) equation.      GFR Estimate If Black >90 >60 mL/min/[1.73_m2]      Comment:       GFR Calc  Starting 12/18/2018, serum creatinine based estimated GFR (eGFR) will be   calculated using the Chronic Kidney Disease Epidemiology Collaboration   (CKD-EPI) equation.      Calcium 9.8 8.5 - 10.1 mg/dL    Bilirubin Total 0.5 0.2 - 1.3 mg/dL    Albumin 4.4 3.4 - 5.0 g/dL    Protein Total 7.4 6.8 - 8.8 g/dL    Alkaline Phosphatase 85 40 - 150 U/L    ALT 44 0 - 50 U/L    AST 28 0 - 45 U/L   Lipid panel reflex to direct LDL Fasting   Result Value Ref Range    Cholesterol 169 <200 mg/dL    Triglycerides 180 (H) <150 mg/dL      Comment:      Borderline high:  150-199 mg/dl  High:             200-499 mg/dl  Very high:       >499 mg/dl  Fasting specimen      HDL Cholesterol 47 (L) >49 mg/dL    LDL Cholesterol Calculated 86 <100 mg/dL      Comment:      Desirable:       <100 mg/dl    Non HDL Cholesterol 122 <130 mg/dL   Albumin Random Urine Quantitative with Creat Ratio   Result Value Ref Range    Creatinine Urine 120 mg/dL    Albumin Urine mg/L 10 mg/L    Albumin Urine mg/g Cr 8.13 0 - 25 mg/g Cr       If you have any questions or concerns, please call the clinic at the number listed above.       Sincerely,    Tesfaye Mason MD

## 2019-03-06 LAB
COPATH REPORT: NORMAL
PAP: NORMAL

## 2019-03-08 LAB
FINAL DIAGNOSIS: NORMAL
HPV HR 12 DNA CVX QL NAA+PROBE: NEGATIVE
HPV16 DNA SPEC QL NAA+PROBE: NEGATIVE
HPV18 DNA SPEC QL NAA+PROBE: NEGATIVE
SPECIMEN DESCRIPTION: NORMAL
SPECIMEN SOURCE CVX/VAG CYTO: NORMAL

## 2019-04-01 ENCOUNTER — TRANSFERRED RECORDS (OUTPATIENT)
Dept: MULTI SPECIALTY CLINIC | Facility: CLINIC | Age: 59
End: 2019-04-01

## 2019-09-03 ENCOUNTER — OFFICE VISIT (OUTPATIENT)
Dept: FAMILY MEDICINE | Facility: CLINIC | Age: 59
End: 2019-09-03
Payer: COMMERCIAL

## 2019-09-03 VITALS
BODY MASS INDEX: 31.22 KG/M2 | HEART RATE: 60 BPM | TEMPERATURE: 98.3 F | WEIGHT: 206 LBS | HEIGHT: 68 IN | DIASTOLIC BLOOD PRESSURE: 84 MMHG | SYSTOLIC BLOOD PRESSURE: 130 MMHG

## 2019-09-03 DIAGNOSIS — E11.9 TYPE 2 DIABETES MELLITUS WITHOUT COMPLICATION, WITHOUT LONG-TERM CURRENT USE OF INSULIN (H): ICD-10-CM

## 2019-09-03 DIAGNOSIS — E78.5 HYPERLIPIDEMIA LDL GOAL <100: ICD-10-CM

## 2019-09-03 LAB
HBA1C MFR BLD: 6.3 % (ref 0–5.6)
TSH SERPL DL<=0.005 MIU/L-ACNC: 2.52 MU/L (ref 0.4–4)

## 2019-09-03 PROCEDURE — 83036 HEMOGLOBIN GLYCOSYLATED A1C: CPT | Performed by: FAMILY MEDICINE

## 2019-09-03 PROCEDURE — 99214 OFFICE O/P EST MOD 30 MIN: CPT | Performed by: FAMILY MEDICINE

## 2019-09-03 PROCEDURE — 84443 ASSAY THYROID STIM HORMONE: CPT | Performed by: FAMILY MEDICINE

## 2019-09-03 PROCEDURE — 36415 COLL VENOUS BLD VENIPUNCTURE: CPT | Performed by: FAMILY MEDICINE

## 2019-09-03 RX ORDER — ATORVASTATIN CALCIUM 10 MG/1
10 TABLET, FILM COATED ORAL DAILY
Qty: 90 TABLET | Refills: 3 | Status: SHIPPED | OUTPATIENT
Start: 2019-09-03 | End: 2020-06-09

## 2019-09-03 RX ORDER — ATORVASTATIN CALCIUM 10 MG/1
10 TABLET, FILM COATED ORAL DAILY
Qty: 90 TABLET | Refills: 3 | Status: CANCELLED | OUTPATIENT
Start: 2019-09-03

## 2019-09-03 ASSESSMENT — MIFFLIN-ST. JEOR: SCORE: 1558.42

## 2019-09-03 NOTE — PROGRESS NOTES
"Subjective     Vannessa Moyer is a 59 year old female who presents to clinic today for the following health issues:    HPI   Diabetes Follow-up  Using metformin 1000 mg BID. She feels good, her last AIC was 6.5    How often are you checking your blood sugar? One time daily    What time of day are you checking your blood sugars (select all that apply)?  Before meals    Have you had any blood sugars above 200?  No    Have you had any blood sugars below 70?  No    What symptoms do you notice when your blood sugar is low?  None    What concerns do you have today about your diabetes? None     Do you have any of these symptoms? (Select all that apply)  No numbness or tingling in feet.  No redness, sores or blisters on feet.  No complaints of excessive thirst.  No reports of blurry vision.  No significant changes to weight.     Have you had a diabetic eye exam in the last 12 months? Yes- Date of last eye exam: 4/2019    BP Readings from Last 2 Encounters:   09/03/19 130/84   03/04/19 108/74     Hemoglobin A1C (%)   Date Value   03/04/2019 6.5 (H)   08/21/2018 6.9 (H)     LDL Cholesterol Calculated (mg/dL)   Date Value   03/04/2019 86   08/21/2018 84       Diabetes Management Resources      How many servings of fruits and vegetables do you eat daily?  2-3    On average, how many sweetened beverages do you drink each day (soda, juice, sweet tea, etc)?   0    How many days per week do you miss taking your medication? 0            Reviewed and updated as needed this visit by Provider         Review of Systems   ROS COMP: Constitutional, HEENT, cardiovascular, pulmonary, gi and gu systems are negative, except as otherwise noted.      Objective    /84   Pulse 60   Temp 98.3  F (36.8  C) (Tympanic)   Ht 1.72 m (5' 7.72\")   Wt 93.4 kg (206 lb)   BMI 31.58 kg/m    Body mass index is 31.58 kg/m .  Physical Exam   GENERAL: healthy, alert and no distress  NECK: no adenopathy, no asymmetry, masses, or scars and thyroid " "normal to palpation  RESP: lungs clear to auscultation - no rales, rhonchi or wheezes  CV: regular rate and rhythm, normal S1 S2, no S3 or S4, no murmur, click or rub, no peripheral edema and peripheral pulses strong  ABDOMEN: soft, nontender, no hepatosplenomegaly, no masses and bowel sounds normal  MS: no gross musculoskeletal defects noted, no edema    Diagnostic Test Results:  Labs reviewed in Epic        Assessment & Plan     1. Hyperlipidemia LDL goal <100    - atorvastatin (LIPITOR) 10 MG tablet; Take 1 tablet (10 mg) by mouth daily  Dispense: 90 tablet; Refill: 3    2. Type 2 diabetes mellitus without complication, without long-term current use of insulin (H)  Hemoglobin A1c stable and within normal range.  She can continue same dose of metformin.  Medication refilled.  Follow-up in 6 months for recheck  - TSH WITH FREE T4 REFLEX  - Hemoglobin A1c  - metFORMIN (GLUCOPHAGE) 1000 MG tablet; Take 1 tablet (1,000 mg) by mouth 2 times daily (with meals)  Dispense: 180 tablet; Refill: 1  - blood glucose (BRIT CONTOUR NEXT) test strip; 100 strips by In Vitro route 3 times daily Use to test blood sugar 3 times daily or as directed.  Dispense: 100 each; Refill: 0     BMI:   Estimated body mass index is 31.58 kg/m  as calculated from the following:    Height as of this encounter: 1.72 m (5' 7.72\").    Weight as of this encounter: 93.4 kg (206 lb).     Tesfaye Mason MD  [facility]      "

## 2019-09-03 NOTE — Clinical Note
Please abstract the following data from this visit with this patient into the appropriate field in Epic:Tests that can be patient reported without a hard copy:Eye exam with ophthalmology on this date: April 2019 [facility] Other Tests found in the patient's chart through Chart Review/Care Everywhere:Note to Abstraction: If this section is blank, no results were found via Chart Review/Care Everywhere.

## 2019-09-03 NOTE — LETTER
September 4, 2019      Vannessa Boydange  [address]  [place] [place] [address]        Dear MsIlanComfort,      I have reviewed your recent labs. Here are the results:     -A1C (test of diabetes control the last 2-3 months) is at your goal. Please continue with your current plan. Also, you should make an appointment to see me and recheck your A1C test in 6 months.   -TSH (thyroid stimulating hormone) level is normal which indicates normal thyroid function.        Resulted Orders   TSH WITH FREE T4 REFLEX   Result Value Ref Range    TSH 2.52 0.40 - 4.00 mU/L   Hemoglobin A1c   Result Value Ref Range    Hemoglobin A1C 6.3 (H) 0 - 5.6 %      Comment:      Normal <5.7% Prediabetes 5.7-6.4%  Diabetes 6.5% or higher - adopted from ADA   consensus guidelines.         If you have any questions or concerns, please call the clinic at the number listed above.       Sincerely,    Tesfaye Mason MD

## 2019-11-04 ENCOUNTER — HEALTH MAINTENANCE LETTER (OUTPATIENT)
Age: 59
End: 2019-11-04

## 2020-01-28 ENCOUNTER — OFFICE VISIT (OUTPATIENT)
Dept: FAMILY MEDICINE | Facility: CLINIC | Age: 60
End: 2020-01-28
Payer: COMMERCIAL

## 2020-01-28 VITALS
TEMPERATURE: 97.3 F | DIASTOLIC BLOOD PRESSURE: 80 MMHG | SYSTOLIC BLOOD PRESSURE: 134 MMHG | OXYGEN SATURATION: 99 % | HEART RATE: 64 BPM | HEIGHT: 68 IN | BODY MASS INDEX: 31.52 KG/M2 | WEIGHT: 208 LBS

## 2020-01-28 DIAGNOSIS — E11.9 TYPE 2 DIABETES MELLITUS WITHOUT COMPLICATION, WITHOUT LONG-TERM CURRENT USE OF INSULIN (H): ICD-10-CM

## 2020-01-28 PROCEDURE — 99213 OFFICE O/P EST LOW 20 MIN: CPT | Performed by: FAMILY MEDICINE

## 2020-01-28 ASSESSMENT — MIFFLIN-ST. JEOR: SCORE: 1566.23

## 2020-01-28 NOTE — PROGRESS NOTES
"Subjective     Vannessa Moyer is a 59 year old female who presents to clinic today for the following health issues:    HPI   Diabetes Follow-up    How often are you checking your blood sugar? One time daily  What time of day are you checking your blood sugars (select all that apply)?  Before meals  Have you had any blood sugars above 200?  No  Have you had any blood sugars below 70?  No    What symptoms do you notice when your blood sugar is low?  None    What concerns do you have today about your diabetes? None     Do you have any of these symptoms? (Select all that apply)  No numbness or tingling in feet.  No redness, sores or blisters on feet.  No complaints of excessive thirst.  No reports of blurry vision.  No significant changes to weight.      BP Readings from Last 2 Encounters:   01/28/20 134/80   09/03/19 130/84     Hemoglobin A1C (%)   Date Value   09/03/2019 6.3 (H)   03/04/2019 6.5 (H)     LDL Cholesterol Calculated (mg/dL)   Date Value   03/04/2019 86   08/21/2018 84                 How many servings of fruits and vegetables do you eat daily?  4 or more    On average, how many sweetened beverages do you drink each day (Examples: soda, juice, sweet tea, etc.  Do NOT count diet or artificially sweetened beverages)?   0    How many days per week do you miss taking your medication? 0              Reviewed and updated as needed this visit by Provider         Review of Systems   ROS COMP: Constitutional, HEENT, cardiovascular, pulmonary, gi and gu systems are negative, except as otherwise noted.      Objective    /80   Pulse 64   Temp 97.3  F (36.3  C) (Tympanic)   Ht 1.718 m (5' 7.64\")   Wt 94.3 kg (208 lb)   SpO2 99%   BMI 31.97 kg/m    Body mass index is 31.97 kg/m .  Physical Exam   GENERAL: healthy, alert and no distress  NECK: no adenopathy, no asymmetry, masses, or scars and thyroid normal to palpation  RESP: lungs clear to auscultation - no rales, rhonchi or wheezes  CV: regular rate " "and rhythm, normal S1 S2, no S3 or S4, no murmur, click or rub, no peripheral edema and peripheral pulses strong    Diagnostic Test Results:  Labs reviewed in Epic        Assessment & Plan     1. Type 2 diabetes mellitus without complication, without long-term current use of insulin (H)  Patient is planing a follow up for her physical in march, will get the blood work done.  - metFORMIN (GLUCOPHAGE) 1000 MG tablet; Take 1 tablet (1,000 mg) by mouth 2 times daily (with meals)  Dispense: 180 tablet; Refill: 0     BMI:   Estimated body mass index is 31.97 kg/m  as calculated from the following:    Height as of this encounter: 1.718 m (5' 7.64\").    Weight as of this encounter: 94.3 kg (208 lb).             Tesfaye Mason MD  [facility] [place]      "

## 2020-06-08 NOTE — PROGRESS NOTES
"Vanenssa Moyer is a 60 year old female who is being evaluated via a billable video visit.      The patient has been notified of following:     \"This video visit will be conducted via a call between you and your physician/provider. We have found that certain health care needs can be provided without the need for an in-person physical exam.  This service lets us provide the care you need with a video conversation.  If a prescription is necessary we can send it directly to your pharmacy.  If lab work is needed we can place an order for that and you can then stop by our lab to have the test done at a later time.    Video visits are billed at different rates depending on your insurance coverage.  Please reach out to your insurance provider with any questions.    If during the course of the call the physician/provider feels a video visit is not appropriate, you will not be charged for this service.\"    Patient has given verbal consent for Video visit? Yes    How would you like to obtain your AVS? Minervahar    Patient would like the video invitation sent by: Send to e-mail at: [email or web address]    Will anyone else be joining your video visit? No      Subjective     Vannessa Moyer is a 60 year old female who presents today via video visit for the following health issues:    HPI  Diabetes Follow-up/hyperlipedemia follow up.      How often are you checking your blood sugar? Varies, on average 5 times per week  What concerns do you have today about your diabetes? None     Do you have any of these symptoms? (Select all that apply)  No numbness or tingling in feet.  No redness, sores or blisters on feet.  No complaints of excessive thirst.  No reports of blurry vision.  No significant changes to weight.    Have you had a diabetic eye exam in the last 12 months? No        BP Readings from Last 2 Encounters:   01/28/20 134/80   09/03/19 130/84     Hemoglobin A1C (%)   Date Value   09/03/2019 6.3 (H)   03/04/2019 " "6.5 (H)     LDL Cholesterol Calculated (mg/dL)   Date Value   03/04/2019 86   08/21/2018 84                 How many servings of fruits and vegetables do you eat daily?  4 or more    On average, how many sweetened beverages do you drink each day (Examples: soda, juice, sweet tea, etc.  Do NOT count diet or artificially sweetened beverages)?   0    How many days per week do you exercise enough to make your heart beat faster? 6    How many minutes a day do you exercise enough to make your heart beat faster? 30 - 60    How many days per week do you miss taking your medication? 0        Video Start Time: 1:25          Reviewed and updated as needed this visit by Provider         Review of Systems   Constitutional, HEENT, cardiovascular, pulmonary, gi and gu systems are negative, except as otherwise noted.      Objective    There were no vitals taken for this visit.  Estimated body mass index is 31.97 kg/m  as calculated from the following:    Height as of 1/28/20: 1.718 m (5' 7.64\").    Weight as of 1/28/20: 94.3 kg (208 lb).  Physical Exam     GENERAL: Healthy, alert and no distress  EYES: Eyes grossly normal to inspection.  No discharge or erythema, or obvious scleral/conjunctival abnormalities.  RESP: No audible wheeze, cough, or visible cyanosis.  No visible retractions or increased work of breathing.    SKIN: Visible skin clear. No significant rash, abnormal pigmentation or lesions.  NEURO: Cranial nerves grossly intact.  Mentation and speech appropriate for age.  PSYCH: Mentation appears normal, affect normal/bright, judgement and insight intact, normal speech and appearance well-groomed.      Diagnostic Test Results:  Labs reviewed in Epic        Assessment & Plan     1. Type 2 diabetes mellitus without complication, without long-term current use of insulin (H)    - HEMOGLOBIN A1C; Future  - Lipid panel reflex to direct LDL Fasting; Future  - Albumin Random Urine Quantitative with Creat Ratio; Future  - metFORMIN " (GLUCOPHAGE) 1000 MG tablet; Take 1 tablet (1,000 mg) by mouth 2 times daily (with meals)  Dispense: 180 tablet; Refill: 1  - Comprehensive metabolic panel; Future    2. Hyperlipidemia LDL goal <100  - Lipid panel reflex to direct LDL Fasting; Future  - Comprehensive metabolic panel; Future  - atorvastatin (LIPITOR) 10 MG tablet; Take 1 tablet (10 mg) by mouth daily  Dispense: 90 tablet; Refill: 1             Tesfaye Mason MD  [facility]      Video-Visit Details    Type of service:  Video Visit    Video End Time:1:38    Originating Location (pt. Location): Home    [place] Location (provider location[place]  [facility] [place]     Platform used for Video Visit: [employer]    No follow-ups on file.

## 2020-06-09 ENCOUNTER — VIRTUAL VISIT (OUTPATIENT)
Dept: FAMILY MEDICINE | Facility: CLINIC | Age: 60
End: 2020-06-09
Payer: COMMERCIAL

## 2020-06-09 DIAGNOSIS — E11.9 TYPE 2 DIABETES MELLITUS WITHOUT COMPLICATION, WITHOUT LONG-TERM CURRENT USE OF INSULIN (H): ICD-10-CM

## 2020-06-09 DIAGNOSIS — E78.5 HYPERLIPIDEMIA LDL GOAL <100: Primary | ICD-10-CM

## 2020-06-09 PROCEDURE — 99214 OFFICE O/P EST MOD 30 MIN: CPT | Mod: GT | Performed by: FAMILY MEDICINE

## 2020-06-09 RX ORDER — ATORVASTATIN CALCIUM 10 MG/1
10 TABLET, FILM COATED ORAL DAILY
Qty: 90 TABLET | Refills: 1 | Status: SHIPPED | OUTPATIENT
Start: 2020-06-09 | End: 2020-12-09

## 2020-06-19 DIAGNOSIS — E78.5 HYPERLIPIDEMIA LDL GOAL <100: ICD-10-CM

## 2020-06-19 DIAGNOSIS — E11.9 TYPE 2 DIABETES MELLITUS WITHOUT COMPLICATION, WITHOUT LONG-TERM CURRENT USE OF INSULIN (H): ICD-10-CM

## 2020-06-19 LAB
ALBUMIN SERPL-MCNC: 4 G/DL (ref 3.4–5)
ALP SERPL-CCNC: 82 U/L (ref 40–150)
ALT SERPL W P-5'-P-CCNC: 47 U/L (ref 0–50)
ANION GAP SERPL CALCULATED.3IONS-SCNC: 5 MMOL/L (ref 3–14)
AST SERPL W P-5'-P-CCNC: 25 U/L (ref 0–45)
BILIRUB SERPL-MCNC: 0.5 MG/DL (ref 0.2–1.3)
BUN SERPL-MCNC: 13 MG/DL (ref 7–30)
CALCIUM SERPL-MCNC: 9.3 MG/DL (ref 8.5–10.1)
CHLORIDE SERPL-SCNC: 108 MMOL/L (ref 94–109)
CHOLEST SERPL-MCNC: 152 MG/DL
CO2 SERPL-SCNC: 26 MMOL/L (ref 20–32)
CREAT SERPL-MCNC: 0.69 MG/DL (ref 0.52–1.04)
CREAT UR-MCNC: 56 MG/DL
GFR SERPL CREATININE-BSD FRML MDRD: >90 ML/MIN/{1.73_M2}
GLUCOSE SERPL-MCNC: 131 MG/DL (ref 70–99)
HBA1C MFR BLD: 6.4 % (ref 0–5.6)
HDLC SERPL-MCNC: 48 MG/DL
LDLC SERPL CALC-MCNC: 80 MG/DL
MICROALBUMIN UR-MCNC: <5 MG/L
MICROALBUMIN/CREAT UR: NORMAL MG/G CR (ref 0–25)
NONHDLC SERPL-MCNC: 104 MG/DL
POTASSIUM SERPL-SCNC: 4.8 MMOL/L (ref 3.4–5.3)
PROT SERPL-MCNC: 7.4 G/DL (ref 6.8–8.8)
SODIUM SERPL-SCNC: 139 MMOL/L (ref 133–144)
TRIGL SERPL-MCNC: 122 MG/DL

## 2020-06-19 PROCEDURE — 82043 UR ALBUMIN QUANTITATIVE: CPT | Performed by: FAMILY MEDICINE

## 2020-06-19 PROCEDURE — 83036 HEMOGLOBIN GLYCOSYLATED A1C: CPT | Performed by: FAMILY MEDICINE

## 2020-06-19 PROCEDURE — 80053 COMPREHEN METABOLIC PANEL: CPT | Performed by: FAMILY MEDICINE

## 2020-06-19 PROCEDURE — 36415 COLL VENOUS BLD VENIPUNCTURE: CPT | Performed by: FAMILY MEDICINE

## 2020-06-19 PROCEDURE — 80061 LIPID PANEL: CPT | Performed by: FAMILY MEDICINE

## 2020-09-01 ENCOUNTER — TRANSFERRED RECORDS (OUTPATIENT)
Dept: MULTI SPECIALTY CLINIC | Facility: CLINIC | Age: 60
End: 2020-09-01

## 2020-09-01 LAB — RETINOPATHY: NORMAL

## 2020-11-16 ENCOUNTER — HEALTH MAINTENANCE LETTER (OUTPATIENT)
Age: 60
End: 2020-11-16

## 2020-12-09 ENCOUNTER — VIRTUAL VISIT (OUTPATIENT)
Dept: FAMILY MEDICINE | Facility: CLINIC | Age: 60
End: 2020-12-09
Payer: COMMERCIAL

## 2020-12-09 DIAGNOSIS — E11.9 TYPE 2 DIABETES MELLITUS WITHOUT COMPLICATION, WITHOUT LONG-TERM CURRENT USE OF INSULIN (H): ICD-10-CM

## 2020-12-09 DIAGNOSIS — E78.5 HYPERLIPIDEMIA LDL GOAL <100: ICD-10-CM

## 2020-12-09 PROCEDURE — 99214 OFFICE O/P EST MOD 30 MIN: CPT | Mod: GT | Performed by: FAMILY MEDICINE

## 2020-12-09 RX ORDER — ATORVASTATIN CALCIUM 10 MG/1
10 TABLET, FILM COATED ORAL DAILY
Qty: 90 TABLET | Refills: 1 | Status: SHIPPED | OUTPATIENT
Start: 2020-12-09 | End: 2021-06-09

## 2020-12-09 NOTE — PROGRESS NOTES
"Vannessa Moyer is a 60 year old female who is being evaluated via a billable video visit.      The patient has been notified of following:     \"This video visit will be conducted via a call between you and your physician/provider. We have found that certain health care needs can be provided without the need for an in-person physical exam.  This service lets us provide the care you need with a video conversation.  If a prescription is necessary we can send it directly to your pharmacy.  If lab work is needed we can place an order for that and you can then stop by our lab to have the test done at a later time.    Video visits are billed at different rates depending on your insurance coverage.  Please reach out to your insurance provider with any questions.    If during the course of the call the physician/provider feels a video visit is not appropriate, you will not be charged for this service.\"    Patient has given verbal consent for Video visit? Yes  How would you like to obtain your AVS? MyChart  If you are dropped from the video visit, the video invite should be resent to: Text to cell phone: [phone]  Will anyone else be joining your video visit? No      Subjective     Vannessa Moyer is a 60 year old female who presents today via video visit for the following health issues:    HPI     Diabetes Follow-up    How often are you checking your blood sugar? One time daily  What time of day are you checking your blood sugars (select all that apply)?  in the morning-fasting  Have you had any blood sugars above 200?  No  Have you had any blood sugars below 70?  No    What symptoms do you notice when your blood sugar is low?  None    What concerns do you have today about your diabetes? None     Do you have any of these symptoms? (Select all that apply)  No numbness or tingling in feet.  No redness, sores or blisters on feet.  No complaints of excessive thirst.  No reports of blurry vision.  No significant " changes to weight.    Have you had a diabetic eye exam in the last 12 months? Yes- Date of last eye exam: 9/1/2020,  Location: [facility]        BP Readings from Last 2 Encounters:   01/28/20 134/80   09/03/19 130/84     Hemoglobin A1C (%)   Date Value   06/19/2020 6.4 (H)   09/03/2019 6.3 (H)     LDL Cholesterol Calculated (mg/dL)   Date Value   06/19/2020 80   03/04/2019 86           How many servings of fruits and vegetables do you eat daily?  4 or more    On average, how many sweetened beverages do you drink each day (Examples: soda, juice, sweet tea, etc.  Do NOT count diet or artificially sweetened beverages)?   0    How many days per week do you exercise enough to make your heart beat faster? 3 or less    How many minutes a day do you exercise enough to make your heart beat faster? 60 or more    How many days per week do you miss taking your medication? 0        Video Start Time: 10:25        Review of Systems   Constitutional, HEENT, cardiovascular, pulmonary, gi and gu systems are negative, except as otherwise noted.      Objective           Vitals:  No vitals were obtained today due to virtual visit.    Physical Exam     GENERAL: Healthy, alert and no distress  EYES: Eyes grossly normal to inspection.  No discharge or erythema, or obvious scleral/conjunctival abnormalities.  RESP: No audible wheeze, cough, or visible cyanosis.  No visible retractions or increased work of breathing.    SKIN: Visible skin clear. No significant rash, abnormal pigmentation or lesions.  NEURO: Cranial nerves grossly intact.  Mentation and speech appropriate for age.  PSYCH: Mentation appears normal, affect normal/bright, judgement and insight intact, normal speech and appearance well-groomed.            Assessment & Plan     Hyperlipidemia LDL goal <100    - atorvastatin (LIPITOR) 10 MG tablet; Take 1 tablet (10 mg) by mouth daily    Type 2 diabetes mellitus without complication, without long-term current use of insulin  "(H)    - metFORMIN (GLUCOPHAGE) 1000 MG tablet; Take 1 tablet (1,000 mg) by mouth 2 times daily (with meals)     BMI:   Estimated body mass index is 31.97 kg/m  as calculated from the following:    Height as of 1/28/20: 1.718 m (5' 7.64\").    Weight as of 1/28/20: 94.3 kg (208 lb).                No follow-ups on file.    Tesfaye Mason MD  [facility]      Video-Visit Details    Type of service:  Video Visit    Video End Time:10:35 AM    Originating Location (pt. Location): Home    Distant Location ([facility] [place]     Platform used for Video Visit: [employer]        "

## 2021-01-15 ENCOUNTER — HEALTH MAINTENANCE LETTER (OUTPATIENT)
Age: 61
End: 2021-01-15

## 2021-04-22 ENCOUNTER — TELEPHONE (OUTPATIENT)
Dept: FAMILY MEDICINE | Facility: CLINIC | Age: 61
End: 2021-04-22

## 2021-04-22 NOTE — TELEPHONE ENCOUNTER
Patient Quality Outreach      Summary:    Patient has the following on her problem list/HM:     Diabetes    Last A1C:  Lab Results   Component Value Date    A1C 6.4 06/19/2020    A1C 6.3 09/03/2019       Last LDL:    Lab Results   Component Value Date    LDL 80 06/19/2020       Is the patient on a Statin? Yes          Is the patient on Aspirin? Yes    Medications     HMG CoA Reductase Inhibitors     atorvastatin (LIPITOR) 10 MG tablet       Salicylates     aspirin 81 MG tablet             Last three blood pressure readings:  BP Readings from Last 3 Encounters:   01/28/20 134/80   09/03/19 130/84   03/04/19 108/74            Tobacco Use      Smoking status: Never Smoker      Smokeless tobacco: Never Used          Patient is due/failing the following:   A1C and Annual wellness, date due: 3/4/2020    Type of outreach:    Sent letter.    Questions for provider review:    Odin Amaya [facility]       Chart routed to .

## 2021-04-22 NOTE — LETTER
April 22, 2021      Vannessa Moyer  [address]  [place] [facility] [address]        Dear Vannessa,    I care about your health and have reviewed your health plan. I have reviewed your medical conditions, medication list, and lab results and am making recommendations based on this review, to better manage your health.    You are in particular need of attention regarding:  -Diabetes  -Wellness (Physical) Visit     I am recommending that you:  -schedule a WELLNESS (Physical) APPOINTMENT with me.        Here is a list of Health Maintenance topics that are due now or due soon:  Health Maintenance Due   Topic Date Due     ANNUAL REVIEW OF HM ORDERS  Never done     Discuss Advance Care Planning  Never done     Pneumococcal Vaccine (1 of 2 - PPSV23) Never done     Diabetic Foot Exam  08/21/2019     Preventive Care Visit  03/04/2020     Colorectal Cancer Screening  04/28/2020     Flu Vaccine (1) 09/01/2020     A1C Lab  12/19/2020     PHQ-2  01/01/2021     Zoster (Shingles) Vaccine (2 of 2) 10/10/2020                     Please call us at [phone] (or use [employer]) to address the above recommendations.     Thank you for trusting [facility] and we appreciate the opportunity to serve you.  We look forward to supporting your healthcare needs in the future.    Healthy Regards,    Tesfaye Mason MD

## 2021-06-02 NOTE — TELEPHONE ENCOUNTER
Patient Quality Outreach 2nd Attempt      Summary:    Type of outreach:    Phone, left message for patient/parent to call back.    Next Steps:  Reach out within 90 days via Letter.    Max number of attempts reached: Yes. Will try again in 90 days if patient still on fail list.    Questions for provider review:    None                                                                                                                    GENARO Amaya [facility]       Chart routed to .

## 2021-06-09 ENCOUNTER — OFFICE VISIT (OUTPATIENT)
Dept: FAMILY MEDICINE | Facility: CLINIC | Age: 61
End: 2021-06-09
Payer: COMMERCIAL

## 2021-06-09 VITALS
HEIGHT: 67 IN | HEART RATE: 83 BPM | DIASTOLIC BLOOD PRESSURE: 74 MMHG | BODY MASS INDEX: 32.24 KG/M2 | SYSTOLIC BLOOD PRESSURE: 110 MMHG | TEMPERATURE: 97.6 F | OXYGEN SATURATION: 96 % | WEIGHT: 205.4 LBS

## 2021-06-09 DIAGNOSIS — K58.1 IRRITABLE BOWEL SYNDROME WITH CONSTIPATION: ICD-10-CM

## 2021-06-09 DIAGNOSIS — E78.5 HYPERLIPIDEMIA LDL GOAL <100: ICD-10-CM

## 2021-06-09 DIAGNOSIS — E11.9 TYPE 2 DIABETES MELLITUS WITHOUT COMPLICATION, WITHOUT LONG-TERM CURRENT USE OF INSULIN (H): Primary | ICD-10-CM

## 2021-06-09 DIAGNOSIS — R79.89 HIGH SERUM THYROID STIMULATING HORMONE (TSH): ICD-10-CM

## 2021-06-09 DIAGNOSIS — Z23 NEED FOR VACCINATION: ICD-10-CM

## 2021-06-09 PROBLEM — K59.00 CONSTIPATION: Status: ACTIVE | Noted: 2020-08-18

## 2021-06-09 LAB — HBA1C MFR BLD: 8.5 % (ref 0–5.6)

## 2021-06-09 PROCEDURE — 90732 PPSV23 VACC 2 YRS+ SUBQ/IM: CPT | Performed by: INTERNAL MEDICINE

## 2021-06-09 PROCEDURE — 83036 HEMOGLOBIN GLYCOSYLATED A1C: CPT | Performed by: INTERNAL MEDICINE

## 2021-06-09 PROCEDURE — 84443 ASSAY THYROID STIM HORMONE: CPT | Performed by: INTERNAL MEDICINE

## 2021-06-09 PROCEDURE — 90471 IMMUNIZATION ADMIN: CPT | Performed by: INTERNAL MEDICINE

## 2021-06-09 PROCEDURE — 36415 COLL VENOUS BLD VENIPUNCTURE: CPT | Performed by: INTERNAL MEDICINE

## 2021-06-09 PROCEDURE — 90750 HZV VACC RECOMBINANT IM: CPT | Performed by: INTERNAL MEDICINE

## 2021-06-09 PROCEDURE — 90472 IMMUNIZATION ADMIN EACH ADD: CPT | Performed by: INTERNAL MEDICINE

## 2021-06-09 PROCEDURE — 99214 OFFICE O/P EST MOD 30 MIN: CPT | Mod: 25 | Performed by: INTERNAL MEDICINE

## 2021-06-09 RX ORDER — ATORVASTATIN CALCIUM 10 MG/1
10 TABLET, FILM COATED ORAL DAILY
Qty: 90 TABLET | Refills: 1 | Status: SHIPPED | OUTPATIENT
Start: 2021-06-09 | End: 2021-07-13

## 2021-06-09 ASSESSMENT — PATIENT HEALTH QUESTIONNAIRE - PHQ9
SUM OF ALL RESPONSES TO PHQ QUESTIONS 1-9: 2
5. POOR APPETITE OR OVEREATING: NOT AT ALL

## 2021-06-09 ASSESSMENT — MIFFLIN-ST. JEOR: SCORE: 1538.28

## 2021-06-09 ASSESSMENT — ANXIETY QUESTIONNAIRES
1. FEELING NERVOUS, ANXIOUS, OR ON EDGE: NOT AT ALL
7. FEELING AFRAID AS IF SOMETHING AWFUL MIGHT HAPPEN: NOT AT ALL
3. WORRYING TOO MUCH ABOUT DIFFERENT THINGS: NOT AT ALL
GAD7 TOTAL SCORE: 0
5. BEING SO RESTLESS THAT IT IS HARD TO SIT STILL: NOT AT ALL
IF YOU CHECKED OFF ANY PROBLEMS ON THIS QUESTIONNAIRE, HOW DIFFICULT HAVE THESE PROBLEMS MADE IT FOR YOU TO DO YOUR WORK, TAKE CARE OF THINGS AT HOME, OR GET ALONG WITH OTHER PEOPLE: NOT DIFFICULT AT ALL
2. NOT BEING ABLE TO STOP OR CONTROL WORRYING: NOT AT ALL
6. BECOMING EASILY ANNOYED OR IRRITABLE: NOT AT ALL

## 2021-06-09 NOTE — PROGRESS NOTES
Assessment and Plan  1. Type 2 diabetes mellitus without complication, without long-term current use of insulin (H)  Stable , last check in [place] in 8/2020. And review of Epic shows last seen our group on 12/2020 for virtual visit for HLD and DMT2. Due for all DM maintenance. Last A1C in 6/2020 was 6.4% On Metformin 1000 mg BID. Will need recheck at this time.   - HEMOGLOBIN A1C  - metFORMIN (GLUCOPHAGE) 1000 MG tablet; Take 1 tablet (1,000 mg) by mouth 2 times daily (with meals)  Dispense: 180 tablet; Refill: 1    2. Hyperlipidemia LDL goal <100  - atorvastatin (LIPITOR) 10 MG tablet; Take 1 tablet (10 mg) by mouth daily  Dispense: 90 tablet; Refill: 1    3. Irritable bowel syndrome with constipation  New problem added to the list. 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 Will start on Linzess for improvement.   - linaclotide (LINZESS) 72 MCG capsule; Take 1 capsule (72 mcg) by mouth every morning (before breakfast)  Dispense: 30 capsule; Refill: 1    4. High serum thyroid stimulating hormone (TSH)  As per review of [place] records, elevated TSH in 8/2020 with Thyroid cyst in [facility] as per the patient , she was told its benign. Will recheck TSH at this time and do further recommendation.   - TSH with free T4 reflex    5. Need for vaccination  - ZOSTER VACCINE RECOMBINANT ADJUVANTED IM NJX  - Pneumococcal vaccine 23 valent PPSV23  (Pneumovax) [26365]     Patient Instructions     As discussed, started on medication Linzess for Constipation.   Will check A1C and also TSH which was abnormal in [place] records last time.     =====================    Patient Education     What Is Irritable Bowel Syndrome (IBS)?  People who have irritable bowel syndrome (IBS)  have digestive tracts that react abnormally to certain substances or to stress. This leads to symptoms like cramps, gas, bloating, pain, constipation, and diarrhea. IBS is sometimes called  spastic colon.  It is  "a common health problem. It is not a disease. But rather it's a group of symptoms that happen together.      Muscle contraction moves food through the digestive tract.    IBS--a motility problem  The muscle movement that passes food through the digestive tract (especially the colon) is called motility.  When you have IBS, this movement is disrupted. Motility may speed up, slow down, or become irregular. If stool passes too quickly through the colon, not enough water is absorbed from it. You may have loose, watery stools (diarrhea). If stool passes through the colon too slowly, too much water is absorbed and the stool becomes hard and dry. You may then have constipation. Also, stool and gas may back up. This can cause painful pressure and cramping.   No single test can diagnose IBS. Your healthcare provider will ask about your symptoms. You may also have blood, stool, or radiologic tests. You may even have a colonoscopy. These tests are done mainly to rule out other illnesses.    What causes IBS?  Lots of research has been done on IBS. But the cause is still not known. Some of the possible factors are:      Smoking, eating certain foods, or drinking alcohol or caffeinated drinks can cause, or \"trigger,\" symptoms of IBS.    No one knows for sure. But IBS may be caused by a problem with the nerves or muscles in your digestive tract.    Some evidence suggests that certain bacteria found after a severe gastrointestinal infection in the small intestine and colon may cause IBS.    Stress and anxiety tend to worsen the symptoms of IBS. But it is not believed to be the cause.   What you can do  Medicine can t cure IBS. But it may help manage the symptoms. It may help your digestive tract work better. Your healthcare provider may prescribe one or more medicines for you. Because some medicines may make IBS worse, don t take any medicine, especially laxatives, unless your healthcare provider prescribes it for you.   Your " healthcare provider may also suggest some lifestyle changes to help control your IBS. You may have to change your diet and learn to better manage your stress. If diet changes are advised, talk with a dietitian. He or she can help you keep a healthy nutritional balance in your food intake.    [employer] last reviewed this educational content on 6/1/2019 2000-2021 [employer]. All rights reserved. This information is not intended as a substitute for professional medical care. Always follow your healthcare professional's instructions.             Return in about 6 months (around 12/9/2021), or if symptoms worsen or fail to improve, for Follow up of last visit.    Keya Ortega MD  [facility] WATSON Hurd is a 61 year old who presents for the following health issues     HPI     Diabetes Follow-up    How often are you checking your blood sugar? One time daily  What time of day are you checking your blood sugars (select all that apply)?  Before meals  Have you had any blood sugars above 200?  Yes 220  Have you had any blood sugars below 70?  No    What symptoms do you notice when your blood sugar is low?  None    What concerns do you have today about your diabetes? Other: feels reading are high     Do you have any of these symptoms? (Select all that apply)  No numbness or tingling in feet.  No redness, sores or blisters on feet.  No complaints of excessive thirst.  No reports of blurry vision.  No significant changes to weight.      BP Readings from Last 2 Encounters:   06/09/21 110/74   01/28/20 134/80     Hemoglobin A1C (%)   Date Value   06/09/2021 8.5 (H)   06/19/2020 6.4 (H)     LDL Cholesterol Calculated (mg/dL)   Date Value   06/19/2020 80   03/04/2019 86                 How many servings of fruits and vegetables do you eat daily?  4 or more    On average, how many sweetened beverages do you drink each day (Examples: soda, juice, sweet tea, etc.  Do NOT count  diet or artificially sweetened beverages)?   0    How many days per week do you exercise enough to make your heart beat faster? 5    How many minutes a day do you exercise enough to make your heart beat faster? 60 or more    How many days per week do you miss taking your medication? 0    Pt is new to me       Allergies   Allergen Reactions     No Known Drug Allergies         Past Medical History:   Diagnosis Date     Colon polyps 4/2014    High grade dysplasia present in one of 4 polyps     Diabetes mellitus, type 2 (H) 7/15/2015     Unspecified vitamin deficiency     VITAMIN B12 DEFICIENCY       Past Surgical History:   Procedure Laterality Date     C LASIK (EMP) W OPT MYOPIA -6.50  2000     COLONOSCOPY  4/2014    4 polyps, repeat in 3 years     HC REMOVAL OF TONSILS,<12 Y/O  1970    Tonsils <13y.o.     HC REPAIR OF HAMMERTOE,ONE  12/2005       Family History   Problem Relation Age of Onset     Neurologic Disorder Mother         parkinsons     Osteoporosis Mother      Thyroid Disease Mother      Cerebrovascular Disease Father      C.A.D. Father         quadruple bypass     Hypertension Father      Cerebrovascular Disease Maternal Grandmother      Cerebrovascular Disease Paternal Grandmother      Circulatory Paternal Grandmother      Cerebrovascular Disease Paternal Grandfather      Hypertension Paternal Grandfather      Cerebrovascular Disease Brother         passed away from [facility]     Diabetes Brother      Thyroid Disease Sister        Social History     Tobacco Use     Smoking status: Never Smoker     Smokeless tobacco: Never Used   Substance Use Topics     Alcohol use: Yes     Alcohol/week: 0.0 standard drinks     Comment: 2-3 glasses of wine weekly        Current Outpatient Medications   Medication     ACE NOT PRESCRIBED, INTENTIONAL,     aspirin 81 MG tablet     atorvastatin (LIPITOR) 10 MG tablet     betamethasone dipropionate (DIPROSONE) 0.05 % cream     blood glucose (BRIT CONTOUR NEXT) test strip      "blood glucose monitoring (BRIT CONTOUR NEXT USB MONITOR) meter device kit     blood glucose monitoring (BRIT MICROLET) lancets     FISH OIL CONCENTRATE 1000 MG OR CAPS     linaclotide (LINZESS) 72 MCG capsule     metFORMIN (GLUCOPHAGE) 1000 MG tablet     MULTIVITAMIN TABS   OR     NONFORMULARY     No current facility-administered medications for this visit.         Review of Systems   Constitutional, HEENT, cardiovascular, pulmonary, GI, , musculoskeletal, neuro, skin, endocrine and psych systems are negative, except as otherwise noted.      Objective    /74 (Cuff Size: Adult Large)   Pulse 83   Temp 97.6  F (36.4  C) (Tympanic)   Ht 1.708 m (5' 7.25\")   Wt 93.2 kg (205 lb 6.4 oz)   SpO2 96%   BMI 31.93 kg/m    Body mass index is 31.93 kg/m .  Physical Exam   GENERAL: healthy, alert and no distress  NECK: no adenopathy, no asymmetry, masses, or scars and thyroid normal to palpation  RESP: lungs clear to auscultation - no rales, rhonchi or wheezes  CV: regular rate and rhythm, normal S1 S2, no S3 or S4, no murmur, click or rub, no peripheral edema and peripheral pulses strong  ABDOMEN: soft, nontender, no hepatosplenomegaly, no masses and bowel sounds normal  MS: no gross musculoskeletal defects noted, no edema    Labs and imaging reviewed and discussed with the patient.      "

## 2021-06-09 NOTE — PATIENT INSTRUCTIONS
"As discussed, started on medication Linzess for Constipation.   Will check A1C and also TSH which was abnormal in [place] records last time.     =====================    Patient Education     What Is Irritable Bowel Syndrome (IBS)?  People who have irritable bowel syndrome (IBS)  have digestive tracts that react abnormally to certain substances or to stress. This leads to symptoms like cramps, gas, bloating, pain, constipation, and diarrhea. IBS is sometimes called  spastic colon.  It is a common health problem. It is not a disease. But rather it's a group of symptoms that happen together.      Muscle contraction moves food through the digestive tract.    IBS--a motility problem  The muscle movement that passes food through the digestive tract (especially the colon) is called motility.  When you have IBS, this movement is disrupted. Motility may speed up, slow down, or become irregular. If stool passes too quickly through the colon, not enough water is absorbed from it. You may have loose, watery stools (diarrhea). If stool passes through the colon too slowly, too much water is absorbed and the stool becomes hard and dry. You may then have constipation. Also, stool and gas may back up. This can cause painful pressure and cramping.   No single test can diagnose IBS. Your healthcare provider will ask about your symptoms. You may also have blood, stool, or radiologic tests. You may even have a colonoscopy. These tests are done mainly to rule out other illnesses.    What causes IBS?  Lots of research has been done on IBS. But the cause is still not known. Some of the possible factors are:      Smoking, eating certain foods, or drinking alcohol or caffeinated drinks can cause, or \"trigger,\" symptoms of IBS.    No one knows for sure. But IBS may be caused by a problem with the nerves or muscles in your digestive tract.    Some evidence suggests that certain bacteria found after a severe gastrointestinal infection in the " small intestine and colon may cause IBS.    Stress and anxiety tend to worsen the symptoms of IBS. But it is not believed to be the cause.   What you can do  Medicine can t cure IBS. But it may help manage the symptoms. It may help your digestive tract work better. Your healthcare provider may prescribe one or more medicines for you. Because some medicines may make IBS worse, don t take any medicine, especially laxatives, unless your healthcare provider prescribes it for you.   Your healthcare provider may also suggest some lifestyle changes to help control your IBS. You may have to change your diet and learn to better manage your stress. If diet changes are advised, talk with a dietitian. He or she can help you keep a healthy nutritional balance in your food intake.    [employer] last reviewed this educational content on 6/1/2019 2000-2021 [employer]. All rights reserved. This information is not intended as a substitute for professional medical care. Always follow your healthcare professional's instructions.

## 2021-06-10 ENCOUNTER — TELEPHONE (OUTPATIENT)
Dept: FAMILY MEDICINE | Facility: CLINIC | Age: 61
End: 2021-06-10

## 2021-06-10 LAB — TSH SERPL DL<=0.005 MIU/L-ACNC: 2.92 MU/L (ref 0.4–4)

## 2021-06-10 ASSESSMENT — ANXIETY QUESTIONNAIRES: GAD7 TOTAL SCORE: 0

## 2021-06-10 NOTE — TELEPHONE ENCOUNTER
----- Message from Keya Ortega MD sent at 6/9/2021 10:22 PM CDT -----  Your diabetes has worsened compared to past at 8.5%. Recommend to add additional medication Januvia once daily along with current Metformin 1000 mg 2x/day for improvement.    Please let me know if you have any questions.  Keya Ortega MD on 6/9/2021 at 10:18 PM

## 2021-06-11 NOTE — TELEPHONE ENCOUNTER
Patient Contact    Attempt # 2    Was call answered?  No.  Left message on voicemail with information to call triage back or to read IFTTT message. Patient does use her MyChart.     On call back: result message noted kimberly.  Harper Denis RN      -

## 2021-06-16 ENCOUNTER — TELEPHONE (OUTPATIENT)
Dept: FAMILY MEDICINE | Facility: CLINIC | Age: 61
End: 2021-06-16

## 2021-06-16 DIAGNOSIS — E11.9 TYPE 2 DIABETES MELLITUS WITHOUT COMPLICATION, WITHOUT LONG-TERM CURRENT USE OF INSULIN (H): Primary | ICD-10-CM

## 2021-06-16 NOTE — TELEPHONE ENCOUNTER
Prior Authorization Retail Medication Request    Medication/Dose: sitagliptin (JANUVIA) 25 MG tablet  ICD code (if different than what is on RX):  Previously Tried and Failed:  Rationale:    Insurance Name: [employer]  Insurance ID: [id number]    Pharmacy Information (if different than what is on RX)  Name: [facility]  Phone: [phone]    Please include previous medications tried and failed.  Please ask insurance for medications on formulary.

## 2021-06-16 NOTE — TELEPHONE ENCOUNTER
PA Initiation    Medication: sitagliptin (JANUVIA) 25 MG tablet  Insurance Company: [employer] - Phone [phone] Fax [phone]  Pharmacy Filling the Rx: [id number] - [place] - [id number]  Filling Pharmacy Phone: [phone]  Filling Pharmacy Fax: [phone]  Start Date: 6/16/2021

## 2021-06-21 NOTE — TELEPHONE ENCOUNTER
PRIOR AUTHORIZATION DENIED    Medication: sitagliptin (JANUVIA) 25 MG tablet    Denial Date: 6/17/2021    Denial Rationale: Patient has to first try/fail: Tradjenta, Jentadueto, alogliptan          Appeal Information: If provider would like to appeal this decision we will need a detailed letter of medical necessity to start the process. Then re-route this request back to the PA pool.

## 2021-06-23 NOTE — TELEPHONE ENCOUNTER
Spoke with the pt and gave her the message below. She does have some questions about her medications and scheduled an appt for 7/1  Litzy Donahue,

## 2021-06-23 NOTE — TELEPHONE ENCOUNTER
I have changed to alternative Jardiance due to insurance not covering her previous prescription. Please call the patient and let her know.     Is there a way we can get the list of covered newer Oral antidiabetics by her insurance ? If this one also denied.    Thank you,  Keya Ortega MD on 6/23/2021 at 1:13 PM

## 2021-07-06 ENCOUNTER — OFFICE VISIT (OUTPATIENT)
Dept: FAMILY MEDICINE | Facility: CLINIC | Age: 61
End: 2021-07-06
Payer: COMMERCIAL

## 2021-07-06 VITALS
HEART RATE: 67 BPM | BODY MASS INDEX: 31.25 KG/M2 | DIASTOLIC BLOOD PRESSURE: 80 MMHG | TEMPERATURE: 96.8 F | WEIGHT: 201 LBS | SYSTOLIC BLOOD PRESSURE: 122 MMHG | OXYGEN SATURATION: 97 %

## 2021-07-06 DIAGNOSIS — E11.9 TYPE 2 DIABETES MELLITUS WITHOUT COMPLICATION, WITHOUT LONG-TERM CURRENT USE OF INSULIN (H): Primary | ICD-10-CM

## 2021-07-06 DIAGNOSIS — K58.1 IRRITABLE BOWEL SYNDROME WITH CONSTIPATION: ICD-10-CM

## 2021-07-06 PROCEDURE — 99214 OFFICE O/P EST MOD 30 MIN: CPT | Performed by: INTERNAL MEDICINE

## 2021-07-06 RX ORDER — CYANOCOBALAMIN 1000 UG/ML
1 INJECTION, SOLUTION INTRAMUSCULAR; SUBCUTANEOUS
COMMUNITY
End: 2021-12-03

## 2021-07-06 ASSESSMENT — PAIN SCALES - GENERAL: PAINLEVEL: NO PAIN (0)

## 2021-07-06 NOTE — PROGRESS NOTES
Assessment and Plan  1. Type 2 diabetes mellitus without complication, without long-term current use of insulin (H)  Uncontrolled, with recent A1C at 8.5 though on Metformin 1000 mg BID and was added Tiburcio by me which her insurnace doesnt cover it and its been changed to Jardiance. -155 as per the patient. She is requesting to hold off on any medictaions prescribed and would prefer to continue current Metformin and diet modifications for improvement on A1C. All the risks and Complications of uncontrolled diabetes discussed which pt understands.   - Lipid panel reflex to direct LDL Fasting; Future  - Albumin Random Urine Quantitative with Creat Ratio; Future  - Comprehensive metabolic panel; Future  - AMBULATORY ADULT DIABETES EDUCATOR REFERRAL; Future  - empagliflozin (JARDIANCE) 10 MG TABS tablet; Take 1 tablet (10 mg) by mouth daily  Dispense: 90 tablet; Refill: 1      2. Irritable bowel syndrome with constipation  Improving, pt is very happy with Linzess started in her last visit. Continue the current medication.       Patient Instructions   As discussed, your A1C is uncontrolled at 8.5. Please be advised that additional medication along with your current Metformin - will be necessary to achieve the goal given your age and avoid complications.    Please make a fasting LAB ONLY appointment at .Labs ordered.        ========================    Patient Education     Diabetes: Understanding Carbohydrates, Fats, and Protein  Food is a source of fuel and nourishment for your body. It s also a source of pleasure. Having diabetes doesn t mean you have to eat special foods or give up desserts. Instead, your dietitian can show you how to plan meals to suit your body. To start, learn how different foods affect blood sugar.  Carbohydrates  Carbohydrates (carbs) are the main source of fuel for the body. They raise blood sugar. Many people think carbohydrates are only in pasta or bread. But carbohydrates are  in many kinds of foods. Carbs include:    Sugars.These are naturally in foods such as fruit, milk, honey, and molasses. Sugars can also be added to many foods. They may be added to cereals and yogurt to candy and desserts. Sugars raise blood sugar.    Starches. These are in bread, cereals, pasta, and dried beans. They re found in corn, peas, potatoes, yam, acorn squash, and butternut squash. Starches raise blood sugar.     Fiber. This is in foods such as vegetables, fruits, beans, and whole grains. Unlike other carbs, fiber isn t digested or absorbed. So it doesn t raise blood sugar. In fact, fiber can help keep blood sugar from rising too fast. It helps keep blood cholesterol at a healthy level.  Did you know?  Even though carbohydrates raise blood sugar, it s best to have some in every meal. They are an important part of a healthy diet.  Fat  Fat is an energy source that can be stored until needed. Fat does not raise blood sugar. But it can raise blood cholesterol. This increases the risk of heart disease. Fat is high in calories. Eating too many calories can cause weight gain. Not all types of fat are the same.  More healthy:    Monounsaturated fats. These are mostly found in vegetable oils, such as olive, canola, and peanut oils. They are found in avocados and some nuts. Monounsaturated fats are healthy for your heart. That s because they lower LDL (unhealthy) cholesterol.    Polyunsaturated fats.These are mostly found in vegetable oils, such as corn, safflower, and soybean oils. They are found in some seeds, nuts, and fish. Polyunsaturated fats lower LDL (unhealthy) cholesterol. So, choosing them instead of saturated fats is healthy for your heart. Certain unsaturated fats can help lower triglycerides.   Less healthy:    Saturated fats.These are found in animal products, such as meat, poultry, whole milk, lard, and butter. Saturated fats raise LDL cholesterol.They are not healthy for your heart.    Hydrogenated  "oilsand trans fats. These are formed when vegetable oils are processed into solid fats. They are found in many processed foods. Hydrogenated oils and trans fats raise LDL (\"bad\") cholesterol and lower HDL (\"good\") cholesterol. They are not healthy for your heart.  Protein  Protein helps the body build and repair muscle and other tissue. Protein has little or no effect on blood sugar. But many foods that have protein also have saturated fat. By choosing low-fat protein sources, you can get the benefits of protein without the extra fat:    Plant protein. This is found in dry beans and peas, nuts, and soy products, such as tofu and soymilk. These foods tend to have no cholesterol.Most are low in saturated fat.    Animal protein. This is found in fish, poultry, meat, cheese, milk, and eggs. These foods have cholesterol.They can be high in saturated fat. Aim for lean, lower-fat choices. Don't eat fried foods.  [email or web address] last reviewed this educational content on 4/1/2019 2000-2021 [employer]. All rights reserved. This information is not intended as a substitute for professional medical care. Always follow your healthcare professional's instructions.                 Return in about 3 months (around 10/6/2021), or if symptoms worsen or fail to improve, for diabetes.    Keya Ortega MD  [facility] WATSON Hurd is a 61 year old who presents for the following health issues       HPI     Diabetes Follow-up    How often are you checking your blood sugar? One time daily  What time of day are you checking your blood sugars (select all that apply)?  Before and after meals  Have you had any blood sugars above 200?  No  Have you had any blood sugars below 70?  No    What symptoms do you notice when your blood sugar is low?  None    What concerns do you have today about your diabetes? None     Do you have any of these symptoms? (Select all that apply)  No numbness or " tingling in feet.  No redness, sores or blisters on feet.  No complaints of excessive thirst.  No reports of blurry vision.  No significant changes to weight.      BP Readings from Last 2 Encounters:   07/06/21 122/80   06/09/21 110/74     Hemoglobin A1C (%)   Date Value   06/09/2021 8.5 (H)   06/19/2020 6.4 (H)     LDL Cholesterol Calculated (mg/dL)   Date Value   06/19/2020 80   03/04/2019 86                 How many servings of fruits and vegetables do you eat daily?  4 or more    On average, how many sweetened beverages do you drink each day (Examples: soda, juice, sweet tea, etc.  Do NOT count diet or artificially sweetened beverages)?   0    How many days per week do you exercise enough to make your heart beat faster? 5    How many minutes a day do you exercise enough to make your heart beat faster? 30 - 60    How many days per week do you miss taking your medication? 0      Last seen pt on 6/9/21 for F/U on diabetes which was uncontrolled  She does need Lipid panel, BMP and Urine microalbumin which is due at this time as pt was not fasting on last visit.        Allergies   Allergen Reactions     No Known Drug Allergies         Past Medical History:   Diagnosis Date     Colon polyps 4/2014    High grade dysplasia present in one of 4 polyps     Diabetes mellitus, type 2 (H) 7/15/2015     Unspecified vitamin deficiency     VITAMIN B12 DEFICIENCY       Past Surgical History:   Procedure Laterality Date     C LASIK (EMP) W OPT MYOPIA -6.50  2000     COLONOSCOPY  4/2014    4 polyps, repeat in 3 years     HC REMOVAL OF TONSILS,<12 Y/O  1970    Tonsils <13y.o.     HC REPAIR OF HAMMERTOE,ONE  12/2005       Family History   Problem Relation Age of Onset     Neurologic Disorder Mother         parkinsons     Osteoporosis Mother      Thyroid Disease Mother      Cerebrovascular Disease Father      C.A.D. Father         quadruple bypass     Hypertension Father      Cerebrovascular Disease Maternal Grandmother       Cerebrovascular Disease Paternal Grandmother      Circulatory Paternal Grandmother      Cerebrovascular Disease Paternal Grandfather      Hypertension Paternal Grandfather      Cerebrovascular Disease Brother         passed away from [facility] [place]     Diabetes Brother      Thyroid Disease Sister        Social History     Tobacco Use     Smoking status: Never Smoker     Smokeless tobacco: Never Used   Substance Use Topics     Alcohol use: Yes     Alcohol/week: 0.0 standard drinks     Comment: 2-3 glasses of wine weekly        Current Outpatient Medications   Medication     aspirin 81 MG tablet     atorvastatin (LIPITOR) 10 MG tablet     blood glucose (BRIT CONTOUR NEXT) test strip     blood glucose monitoring (BRIT CONTOUR NEXT USB MONITOR) meter device kit     blood glucose monitoring (BRIT MICROLET) lancets     cyanocobalamin (CYANOCOBALAMIN) 1000 MCG/ML injection     empagliflozin (JARDIANCE) 10 MG TABS tablet     FISH OIL CONCENTRATE 1000 MG OR CAPS     linaclotide (LINZESS) 72 MCG capsule     metFORMIN (GLUCOPHAGE) 1000 MG tablet     MULTIVITAMIN TABS   OR     NONFORMULARY     ACE NOT PRESCRIBED, INTENTIONAL,     betamethasone dipropionate (DIPROSONE) 0.05 % cream     No current facility-administered medications for this visit.         Review of Systems   Constitutional, HEENT, cardiovascular, pulmonary, GI, , musculoskeletal, neuro, skin, endocrine and psych systems are negative, except as otherwise noted.      Objective    /80   Pulse 67   Temp 96.8  F (36  C) (Tympanic)   Wt 91.2 kg (201 lb)   SpO2 97%   BMI 31.25 kg/m    Body mass index is 31.25 kg/m .  Physical Exam   GENERAL: healthy, alert and no distress  NECK: no adenopathy, no asymmetry, masses, or scars and thyroid normal to palpation  RESP: lungs clear to auscultation - no rales, rhonchi or wheezes  CV: regular rate and rhythm, normal S1 S2, no S3 or S4, no murmur, click or rub, no peripheral edema and peripheral pulses strong  ABDOMEN: soft,  nontender, no hepatosplenomegaly, no masses and bowel sounds normal  MS: no gross musculoskeletal defects noted, no edema

## 2021-07-06 NOTE — PATIENT INSTRUCTIONS
As discussed, your A1C is uncontrolled at 8.5. Please be advised that additional medication along with your current Metformin - will be necessary to achieve the goal given your age and avoid complications.    Please make a fasting LAB ONLY appointment at .Labs ordered.        ========================    Patient Education     Diabetes: Understanding Carbohydrates, Fats, and Protein  Food is a source of fuel and nourishment for your body. It s also a source of pleasure. Having diabetes doesn t mean you have to eat special foods or give up desserts. Instead, your dietitian can show you how to plan meals to suit your body. To start, learn how different foods affect blood sugar.  Carbohydrates  Carbohydrates (carbs) are the main source of fuel for the body. They raise blood sugar. Many people think carbohydrates are only in pasta or bread. But carbohydrates are in many kinds of foods. Carbs include:    Sugars.These are naturally in foods such as fruit, milk, honey, and molasses. Sugars can also be added to many foods. They may be added to cereals and yogurt to candy and desserts. Sugars raise blood sugar.    Starches. These are in bread, cereals, pasta, and dried beans. They re found in corn, peas, potatoes, yam, acorn squash, and butternut squash. Starches raise blood sugar.     Fiber. This is in foods such as vegetables, fruits, beans, and whole grains. Unlike other carbs, fiber isn t digested or absorbed. So it doesn t raise blood sugar. In fact, fiber can help keep blood sugar from rising too fast. It helps keep blood cholesterol at a healthy level.  Did you know?  Even though carbohydrates raise blood sugar, it s best to have some in every meal. They are an important part of a healthy diet.  Fat  Fat is an energy source that can be stored until needed. Fat does not raise blood sugar. But it can raise blood cholesterol. This increases the risk of heart disease. Fat is high in calories. Eating too many  "calories can cause weight gain. Not all types of fat are the same.  More healthy:    Monounsaturated fats. These are mostly found in vegetable oils, such as olive, canola, and peanut oils. They are found in avocados and some nuts. Monounsaturated fats are healthy for your heart. That s because they lower LDL (unhealthy) cholesterol.    Polyunsaturated fats.These are mostly found in vegetable oils, such as corn, safflower, and soybean oils. They are found in some seeds, nuts, and fish. Polyunsaturated fats lower LDL (unhealthy) cholesterol. So, choosing them instead of saturated fats is healthy for your heart. Certain unsaturated fats can help lower triglycerides.   Less healthy:    Saturated fats.These are found in animal products, such as meat, poultry, whole milk, lard, and butter. Saturated fats raise LDL cholesterol.They are not healthy for your heart.    Hydrogenated oilsand trans fats. These are formed when vegetable oils are processed into solid fats. They are found in many processed foods. Hydrogenated oils and trans fats raise LDL (\"bad\") cholesterol and lower HDL (\"good\") cholesterol. They are not healthy for your heart.  Protein  Protein helps the body build and repair muscle and other tissue. Protein has little or no effect on blood sugar. But many foods that have protein also have saturated fat. By choosing low-fat protein sources, you can get the benefits of protein without the extra fat:    Plant protein. This is found in dry beans and peas, nuts, and soy products, such as tofu and soymilk. These foods tend to have no cholesterol.Most are low in saturated fat.    Animal protein. This is found in fish, poultry, meat, cheese, milk, and eggs. These foods have cholesterol.They can be high in saturated fat. Aim for lean, lower-fat choices. Don't eat fried foods.  [employer] last reviewed this educational content on 4/1/2019 2000-2021 [employer]. All rights reserved. This information is not " intended as a substitute for professional medical care. Always follow your healthcare professional's instructions.

## 2021-07-07 ENCOUNTER — TELEPHONE (OUTPATIENT)
Dept: FAMILY MEDICINE | Facility: CLINIC | Age: 61
End: 2021-07-07

## 2021-07-07 NOTE — TELEPHONE ENCOUNTER
Diabetes Education Scheduling Outreach #1:    Call to patient to schedule. Left message with phone number to call to schedule.    Plan for 2nd outreach attempt within 1 week.    [facility]  Diabetes and Nutrition Scheduling

## 2021-07-09 DIAGNOSIS — E11.9 TYPE 2 DIABETES MELLITUS WITHOUT COMPLICATION, WITHOUT LONG-TERM CURRENT USE OF INSULIN (H): ICD-10-CM

## 2021-07-09 PROCEDURE — 80053 COMPREHEN METABOLIC PANEL: CPT | Performed by: INTERNAL MEDICINE

## 2021-07-09 PROCEDURE — 82043 UR ALBUMIN QUANTITATIVE: CPT | Performed by: INTERNAL MEDICINE

## 2021-07-09 PROCEDURE — 80061 LIPID PANEL: CPT | Performed by: INTERNAL MEDICINE

## 2021-07-09 PROCEDURE — 36415 COLL VENOUS BLD VENIPUNCTURE: CPT | Performed by: INTERNAL MEDICINE

## 2021-07-10 LAB
ALBUMIN SERPL-MCNC: 4.8 G/DL (ref 3.4–5)
ALP SERPL-CCNC: 86 U/L (ref 40–150)
ALT SERPL W P-5'-P-CCNC: 60 U/L (ref 0–50)
ANION GAP SERPL CALCULATED.3IONS-SCNC: 2 MMOL/L (ref 3–14)
AST SERPL W P-5'-P-CCNC: 29 U/L (ref 0–45)
BILIRUB SERPL-MCNC: 0.6 MG/DL (ref 0.2–1.3)
BUN SERPL-MCNC: 11 MG/DL (ref 7–30)
CALCIUM SERPL-MCNC: 10 MG/DL (ref 8.5–10.1)
CHLORIDE SERPL-SCNC: 108 MMOL/L (ref 94–109)
CHOLEST SERPL-MCNC: 191 MG/DL
CO2 SERPL-SCNC: 28 MMOL/L (ref 20–32)
CREAT SERPL-MCNC: 0.76 MG/DL (ref 0.52–1.04)
CREAT UR-MCNC: 20 MG/DL
GFR SERPL CREATININE-BSD FRML MDRD: 85 ML/MIN/{1.73_M2}
GLUCOSE SERPL-MCNC: 121 MG/DL (ref 70–99)
HDLC SERPL-MCNC: 47 MG/DL
LDLC SERPL CALC-MCNC: 112 MG/DL
MICROALBUMIN UR-MCNC: <5 MG/L
MICROALBUMIN/CREAT UR: NORMAL MG/G CR (ref 0–25)
NONHDLC SERPL-MCNC: 144 MG/DL
POTASSIUM SERPL-SCNC: 4.7 MMOL/L (ref 3.4–5.3)
PROT SERPL-MCNC: 7.6 G/DL (ref 6.8–8.8)
SODIUM SERPL-SCNC: 138 MMOL/L (ref 133–144)
TRIGL SERPL-MCNC: 159 MG/DL

## 2021-07-12 ENCOUNTER — TELEPHONE (OUTPATIENT)
Dept: FAMILY MEDICINE | Facility: CLINIC | Age: 61
End: 2021-07-12

## 2021-07-12 DIAGNOSIS — E78.5 HYPERLIPIDEMIA LDL GOAL <100: Primary | ICD-10-CM

## 2021-07-12 NOTE — TELEPHONE ENCOUNTER
Non detailed message left for pt to return call to clinic and ask to speak with a triage nurse.   Advised results and message from Dr. Ortega are available on my chart.    Edilma HARKINS RN  EP Triage

## 2021-07-12 NOTE — TELEPHONE ENCOUNTER
----- Message from Keya Ortega MD sent at 7/10/2021  4:42 PM CDT -----  Your Cholesterol levels are remaining abnormal inspite of your current Atorvastatin 10 mg daily. Please confirm me if you are taking this medication regularly ? Before I increase the dose of it.     Your one of the liver enzyme is mildly abnormal which is possibly related to your alcohol intake. Please quit it completely for improvement. Will need follow up with your next A1C .     Let me know if you have any questions. I await for your reply on the Atorvastatin before I send new prescription.     Thank you,  Keya Ortega MD on 7/10/2021 at 4:42 PM

## 2021-07-13 RX ORDER — ATORVASTATIN CALCIUM 20 MG/1
20 TABLET, FILM COATED ORAL DAILY
Qty: 90 TABLET | Refills: 1 | Status: SHIPPED | OUTPATIENT
Start: 2021-07-13 | End: 2022-02-23

## 2021-07-14 NOTE — TELEPHONE ENCOUNTER
S/w pt and gave Dr. Ortega's reply below.  Advised pt to be seen in 3 months for recheck.    Pt states understanding.    Edilma HARKINS RN  EP Triage

## 2021-07-14 NOTE — TELEPHONE ENCOUNTER
Diabetes Education Scheduling Outreach #2:    Call to patient to schedule. Left message with phone number to call to schedule.    Teresa Alvarado  [facility]  Diabetes and Nutrition Scheduling

## 2021-07-14 NOTE — TELEPHONE ENCOUNTER
Sent in increased dose of Atorvastatin to your pharmacy. Will recheck LFTs along with A1C in patient follow up visit with us.    Thank you,  Keya Ortega MD on 7/13/2021 at 9:32 PM

## 2021-08-17 DIAGNOSIS — K58.1 IRRITABLE BOWEL SYNDROME WITH CONSTIPATION: ICD-10-CM

## 2021-08-18 NOTE — TELEPHONE ENCOUNTER
Routing refill request to provider for review/approval because:  Drug not on the [facility] refill protocol     Edilma HARKINS RN  EP Triage

## 2021-09-18 ENCOUNTER — HEALTH MAINTENANCE LETTER (OUTPATIENT)
Age: 61
End: 2021-09-18

## 2021-12-03 DIAGNOSIS — D51.0 PERNICIOUS ANEMIA: Primary | ICD-10-CM

## 2021-12-03 DIAGNOSIS — E11.9 TYPE 2 DIABETES MELLITUS WITHOUT COMPLICATION, WITHOUT LONG-TERM CURRENT USE OF INSULIN (H): ICD-10-CM

## 2021-12-03 NOTE — TELEPHONE ENCOUNTER
prescrined at [facility]    Pt calling requesting refill for 2 meds.     Vit B12 & Metformin    She has appt with PCP:  Appointments in Next Year    Jan 25, 2022  7:30 AM  (Arrive by 7:10 AM)  Office Visit with Keya Ortega MD  [facility] ) [phone]

## 2021-12-04 RX ORDER — CYANOCOBALAMIN 1000 UG/ML
1 INJECTION, SOLUTION INTRAMUSCULAR; SUBCUTANEOUS
Qty: 10 ML | Refills: 0 | Status: SHIPPED | OUTPATIENT
Start: 2021-12-04

## 2021-12-06 DIAGNOSIS — E11.9 TYPE 2 DIABETES MELLITUS WITHOUT COMPLICATION, WITHOUT LONG-TERM CURRENT USE OF INSULIN (H): ICD-10-CM

## 2021-12-06 NOTE — TELEPHONE ENCOUNTER
Patient called, stated she needs her metformin medication for a week and a half, she will be out of town and a week and a half will get her through her time away.

## 2021-12-08 NOTE — TELEPHONE ENCOUNTER
Routing refill request to provider for review/approval because:  Labs not current:  A1C    Edilma HARKINS RN  EP Triage

## 2021-12-09 NOTE — TELEPHONE ENCOUNTER
Refill done for short supply.    Please call the patient and let her know to keep up her follow up appointment in 1/2022 as scheduled since we will need to recheck her labs and possible changes on the medications as it was Uncontrolled.     Thank you,  Keya Ortega MD on 12/8/2021 at 6:16 PM

## 2022-01-08 ENCOUNTER — HEALTH MAINTENANCE LETTER (OUTPATIENT)
Age: 62
End: 2022-01-08

## 2022-02-23 ENCOUNTER — OFFICE VISIT (OUTPATIENT)
Dept: FAMILY MEDICINE | Facility: CLINIC | Age: 62
End: 2022-02-23
Payer: COMMERCIAL

## 2022-02-23 VITALS
HEIGHT: 67 IN | DIASTOLIC BLOOD PRESSURE: 78 MMHG | HEART RATE: 79 BPM | WEIGHT: 181 LBS | RESPIRATION RATE: 16 BRPM | OXYGEN SATURATION: 98 % | BODY MASS INDEX: 28.41 KG/M2 | TEMPERATURE: 97.4 F | SYSTOLIC BLOOD PRESSURE: 122 MMHG

## 2022-02-23 DIAGNOSIS — E11.9 TYPE 2 DIABETES MELLITUS WITHOUT COMPLICATION, WITHOUT LONG-TERM CURRENT USE OF INSULIN (H): Primary | ICD-10-CM

## 2022-02-23 DIAGNOSIS — E53.8 VITAMIN B12 DEFICIENCY (NON ANEMIC): ICD-10-CM

## 2022-02-23 DIAGNOSIS — Z23 NEED FOR PROPHYLACTIC VACCINATION AND INOCULATION AGAINST INFLUENZA: ICD-10-CM

## 2022-02-23 DIAGNOSIS — M85.80 AGE-RELATED BONE LOSS: ICD-10-CM

## 2022-02-23 DIAGNOSIS — E78.5 HYPERLIPIDEMIA LDL GOAL <100: ICD-10-CM

## 2022-02-23 DIAGNOSIS — D51.0 PERNICIOUS ANEMIA: ICD-10-CM

## 2022-02-23 LAB
DEPRECATED CALCIDIOL+CALCIFEROL SERPL-MC: 33 UG/L (ref 20–75)
HBA1C MFR BLD: 6.9 % (ref 0–5.6)
VIT B12 SERPL-MCNC: 321 PG/ML (ref 193–986)

## 2022-02-23 PROCEDURE — 99207 PR FOOT EXAM NO CHARGE: CPT | Mod: 25 | Performed by: INTERNAL MEDICINE

## 2022-02-23 PROCEDURE — 90471 IMMUNIZATION ADMIN: CPT | Performed by: INTERNAL MEDICINE

## 2022-02-23 PROCEDURE — 83036 HEMOGLOBIN GLYCOSYLATED A1C: CPT | Performed by: INTERNAL MEDICINE

## 2022-02-23 PROCEDURE — 36415 COLL VENOUS BLD VENIPUNCTURE: CPT | Performed by: INTERNAL MEDICINE

## 2022-02-23 PROCEDURE — 82607 VITAMIN B-12: CPT | Performed by: INTERNAL MEDICINE

## 2022-02-23 PROCEDURE — 82306 VITAMIN D 25 HYDROXY: CPT | Performed by: INTERNAL MEDICINE

## 2022-02-23 PROCEDURE — 90682 RIV4 VACC RECOMBINANT DNA IM: CPT | Performed by: INTERNAL MEDICINE

## 2022-02-23 PROCEDURE — 99214 OFFICE O/P EST MOD 30 MIN: CPT | Mod: 25 | Performed by: INTERNAL MEDICINE

## 2022-02-23 RX ORDER — SYRINGE W-NEEDLE,DISPOSAB,3 ML 25GX1"
1 SYRINGE, EMPTY DISPOSABLE MISCELLANEOUS
Qty: 3 EACH | Refills: 3 | Status: CANCELLED | OUTPATIENT
Start: 2022-02-23

## 2022-02-23 RX ORDER — ATORVASTATIN CALCIUM 20 MG/1
20 TABLET, FILM COATED ORAL DAILY
Qty: 90 TABLET | Refills: 1 | Status: SHIPPED | OUTPATIENT
Start: 2022-02-23 | End: 2022-08-27

## 2022-02-23 RX ORDER — CYANOCOBALAMIN 1000 UG/ML
1 INJECTION, SOLUTION INTRAMUSCULAR; SUBCUTANEOUS
Qty: 10 ML | Refills: 0 | Status: CANCELLED | OUTPATIENT
Start: 2022-02-23

## 2022-02-23 ASSESSMENT — PAIN SCALES - GENERAL: PAINLEVEL: NO PAIN (0)

## 2022-02-23 NOTE — PATIENT INSTRUCTIONS
As discussed, will check lab work due at this time and do further changes on medications.     =====================    Patient Education     Vitamin B-12  Does this test have other names?  VB12, serum cobalamin, antipernicious anemia factor   What is this test?  This test measures the level of vitamin B-12 in your blood. You need this vitamin to make red blood cells and for your nervous system to function as it should.   You get vitamin B-12 from eating foods that come from animals, such as meat, eggs, and dairy products. Vitamin B-12 is also added to some cereals. You can also take this vitamin as a supplement in pill form.   Why do I need this test?  You may need this test if your healthcare provider suspects that your vitamin B-12 level is low. A low level of vitamin B-12 is called vitamin B-12 deficiency. You are more likely to have vitamin B-12 deficiency if you are an older adult, have a digestive disorder called malabsorption, have had gastrointestinal surgery, or eat a vegan diet. Women who are pregnant or breastfeeding and eat a vegetarian-type diet are at high risk for this deficiency in themselves and their babies.   You may also need this test if you've been diagnosed with or your healthcare provider suspects a disease called pernicious anemia. Pernicious anemia involves a lack of a protein found in your stomach that makes it hard to absorb vitamin B-12.   These are common symptoms of vitamin B-12 deficiency:    Fatigue    Weakness    Weight loss    Tingling or numbness of the hands and feet    Constipation    Poor balance    Confusion    Depression    Memory loss    Soreness of the mouth or tongue  What other tests might I have along with this test?  Your healthcare provider may order other tests to help find out the cause of your vitamin B-12 deficiency. These tests may include:     Complete blood count    Peripheral blood smear, which involves looking at your blood cells under a microscope    Folic  acid level. This vitamin is also important for red blood cell production.    Methylmalonic and homocysteine levels, which are part of vitamin B-12 and folate metabolism   What do my test results mean?  Test results may vary depending on your age, gender, health history, the method used for the test, and other things. Your test results may not mean you have a problem. Ask your healthcare provider what your test results mean for you.    Vitamin B-12 is measured in picograms per milliliter (pg/mL). Normal results are:    280 to 1500 pg/mL for adults    160 to 1,300 pg/mL for newborns  If your results are low, you may have:    Pernicious anemia    Malabsorption from inflammatory bowel disease or other causes    Poor absorption because of surgery    Tapeworm infection    Too little intake of animal protein    Folic acid deficiency    Iron deficiency  If your levels are high, you may have:    Liver or kidney disease    Diabetes    Obesity    White blood cell cancer  High levels may also mean that you have chronic obstructive pulmonary disease (COPD), heart failure, or a rare type of blood cancer called polycythemia vera.   How is this test done?  The test is done with a blood sample. A needle is used to draw blood from a vein in your arm or hand.    Does this test pose any risks?  Having a blood test with a needle carries some risks. These include bleeding, infection, bruising, and feeling lightheaded. When the needle pricks your arm or hand, you may feel a slight sting or pain. Afterward, the site may be sore.    What might affect my test results?  Certain conditions may affect your test results. These include:    Pregnancy    Recent blood transfusions    Smoking  Medicines in general may also affect your results. Specific medicines include supplements of vitamin A or C and birth control pills.   How do I get ready for this test?  Follow your healthcare provider's instructions about not eating or drinking for a specific  time before the test. You may be able to drink water. You should not have a vitamin B-12 injection before the test. Be sure your healthcare provider knows about all medicines, herbs, vitamins, and supplements you are taking. This includes medicines that don't need a prescription and any illegal drugs you may use.   [employer] last reviewed this educational content on 10/1/2020    3865-6996 [employer]. All rights reserved. This information is not intended as a substitute for professional medical care. Always follow your healthcare professional's instructions.

## 2022-02-23 NOTE — PROGRESS NOTES
Assessment and Plan  1. Type 2 diabetes mellitus without complication, without long-term current use of insulin (H)  Uncontrolled, last A1C in 6/2021 at 8.5% which has worsened from the past at 6.4. Have added Jardiance ( which pt has not been taking ) to her Metformin for improvement. She is due for recheck at this time. Pt does state she has upcoming eye appointment with [facility] in 5/2022  - metFORMIN (GLUCOPHAGE) 1000 MG tablet; Take 1 tablet (1,000 mg) by mouth 2 times daily (with meals)  Dispense: 180 tablet; Refill: 0  - Hemoglobin A1c; Future  - FOOT EXAM    2. Hyperlipidemia LDL goal <100  - atorvastatin (LIPITOR) 20 MG tablet; Take 1 tablet (20 mg) by mouth daily  Dispense: 90 tablet; Refill: 1    4. Pernicious anemia  5. Vitamin B12 deficiency (non anemic)  Pt has been taking IM injections herself at home, will recheck before planning to refill it. Pt understood and agreed with the plan.   - Vitamin B12; Future    6. Age-related bone loss  - Vitamin D deficiency screening; Future    7. Need for prophylactic vaccination and inoculation against influenza  - INFLUENZA QUAD, RECOMBINANT, P-FREE (RIV4) (FLUBLOK)       Patient Instructions   As discussed, will check lab work due at this time and do further changes on medications.     =====================    Patient Education     Vitamin B-12  Does this test have other names?  VB12, serum cobalamin, antipernicious anemia factor   What is this test?  This test measures the level of vitamin B-12 in your blood. You need this vitamin to make red blood cells and for your nervous system to function as it should.   You get vitamin B-12 from eating foods that come from animals, such as meat, eggs, and dairy products. Vitamin B-12 is also added to some cereals. You can also take this vitamin as a supplement in pill form.   Why do I need this test?  You may need this test if your healthcare provider suspects that your vitamin B-12 level is low. A low level of vitamin B-12 is  called vitamin B-12 deficiency. You are more likely to have vitamin B-12 deficiency if you are an older adult, have a digestive disorder called malabsorption, have had gastrointestinal surgery, or eat a vegan diet. Women who are pregnant or breastfeeding and eat a vegetarian-type diet are at high risk for this deficiency in themselves and their babies.   You may also need this test if you've been diagnosed with or your healthcare provider suspects a disease called pernicious anemia. Pernicious anemia involves a lack of a protein found in your stomach that makes it hard to absorb vitamin B-12.   These are common symptoms of vitamin B-12 deficiency:    Fatigue    Weakness    Weight loss    Tingling or numbness of the hands and feet    Constipation    Poor balance    Confusion    Depression    Memory loss    Soreness of the mouth or tongue  What other tests might I have along with this test?  Your healthcare provider may order other tests to help find out the cause of your vitamin B-12 deficiency. These tests may include:     Complete blood count    Peripheral blood smear, which involves looking at your blood cells under a microscope    Folic acid level. This vitamin is also important for red blood cell production.    Methylmalonic and homocysteine levels, which are part of vitamin B-12 and folate metabolism   What do my test results mean?  Test results may vary depending on your age, gender, health history, the method used for the test, and other things. Your test results may not mean you have a problem. Ask your healthcare provider what your test results mean for you.    Vitamin B-12 is measured in picograms per milliliter (pg/mL). Normal results are:    280 to 1500 pg/mL for adults    160 to 1,300 pg/mL for newborns  If your results are low, you may have:    Pernicious anemia    Malabsorption from inflammatory bowel disease or other causes    Poor absorption because of surgery    Tapeworm infection    Too little  intake of animal protein    Folic acid deficiency    Iron deficiency  If your levels are high, you may have:    Liver or kidney disease    Diabetes    Obesity    White blood cell cancer  High levels may also mean that you have chronic obstructive pulmonary disease (COPD), heart failure, or a rare type of blood cancer called polycythemia vera.   How is this test done?  The test is done with a blood sample. A needle is used to draw blood from a vein in your arm or hand.    Does this test pose any risks?  Having a blood test with a needle carries some risks. These include bleeding, infection, bruising, and feeling lightheaded. When the needle pricks your arm or hand, you may feel a slight sting or pain. Afterward, the site may be sore.    What might affect my test results?  Certain conditions may affect your test results. These include:    Pregnancy    Recent blood transfusions    Smoking  Medicines in general may also affect your results. Specific medicines include supplements of vitamin A or C and birth control pills.   How do I get ready for this test?  Follow your healthcare provider's instructions about not eating or drinking for a specific time before the test. You may be able to drink water. You should not have a vitamin B-12 injection before the test. Be sure your healthcare provider knows about all medicines, herbs, vitamins, and supplements you are taking. This includes medicines that don't need a prescription and any illegal drugs you may use.   [employer] last reviewed this educational content on 10/1/2020    8021-7347 [employer]. All rights reserved. This information is not intended as a substitute for professional medical care. Always follow your healthcare professional's instructions.             Return in about 5 months (around 7/23/2022), or if symptoms worsen or fail to improve, for Preventative Visit.    Keya Ortega MD  [facility] [place]        Subjective    Vannessa is a 61 year old who presents for the following health issues     Last seen pt in 7/2021 for diabetes follow up . She is here for diabetes recheck.       History of Present Illness       Diabetes:   She presents for follow up of diabetes.  She is checking home blood glucose one time daily. She checks blood glucose before meals.  Blood glucose is never over 200 and never under 70. She is aware of hypoglycemia symptoms including shakiness. She is concerned about other. She is not experiencing numbness or burning in feet, excessive thirst, blurry vision, weight changes or redness, sores or blisters on feet. The patient has had a diabetic eye exam in the last 12 months. Eye exam performed on 8/22/2020. Location of last eye exam [facility].        She eats 4 or more servings of fruits and vegetables daily.She consumes 0 sweetened beverage(s) daily.She exercises with enough effort to increase her heart rate 30 to 60 minutes per day.  She exercises with enough effort to increase her heart rate 5 days per week.   She is taking medications regularly.        Allergies   Allergen Reactions     No Known Drug Allergies         Past Medical History:   Diagnosis Date     Colon polyps 4/2014    High grade dysplasia present in one of 4 polyps     Diabetes mellitus, type 2 (H) 7/15/2015     Unspecified vitamin deficiency     VITAMIN B12 DEFICIENCY       Past Surgical History:   Procedure Laterality Date     COLONOSCOPY  4/2014    4 polyps, repeat in 3 years     HC REMOVAL OF TONSILS,<14 Y/O  1970    Tonsils <13y.o.     HC REPAIR OF ANANTERTOE,ONE  12/2005     [facility] LASIK (EMP) W OPT MYOPIA -6.50  2000       Family History   Problem Relation Age of Onset     Neurologic Disorder Mother         parkinsons     Osteoporosis Mother      Thyroid Disease Mother      Cerebrovascular Disease Father      C.A.D. Father         quadruple bypass     Hypertension Father      Cerebrovascular Disease Maternal Grandmother      Cerebrovascular  "Disease Paternal Grandmother      Circulatory Paternal Grandmother      Cerebrovascular Disease Paternal Grandfather      Hypertension Paternal Grandfather      Cerebrovascular Disease Brother         [place] [facility] [place]     Diabetes Brother      Thyroid Disease Sister        Social History     Tobacco Use     Smoking status: Never Smoker     Smokeless tobacco: Never Used   Substance Use Topics     Alcohol use: Yes     Alcohol/week: 0.0 standard drinks     Comment: 2-3 glasses of wine weekly        Current Outpatient Medications   Medication     ACE NOT PRESCRIBED, INTENTIONAL,     aspirin 81 MG tablet     atorvastatin (LIPITOR) 20 MG tablet     betamethasone dipropionate (DIPROSONE) 0.05 % cream     blood glucose (BRIT CONTOUR NEXT) test strip     blood glucose monitoring (BRIT CONTOUR NEXT USB MONITOR) meter device kit     blood glucose monitoring (BRIT MICROLET) lancets     cyanocobalamin (CYANOCOBALAMIN) 1000 MCG/ML injection     FISH OIL CONCENTRATE 1000 MG OR CAPS     metFORMIN (GLUCOPHAGE) 1000 MG tablet     MULTIVITAMIN TABS   OR     NONFORMULARY     syringe/needle, disp, (EASY TOUCH SAFETY SYRINGE) 25G X 1\" 3 ML MISC     empagliflozin (JARDIANCE) 10 MG TABS tablet     linaclotide (LINZESS) 72 MCG capsule     No current facility-administered medications for this visit.        Review of Systems   Constitutional, HEENT, cardiovascular, pulmonary, GI, , musculoskeletal, neuro, skin, endocrine and psych systems are negative, except as otherwise noted.      Objective    /78   Pulse 79   Temp 97.4  F (36.3  C) (Tympanic)   Resp 16   Ht 1.712 m (5' 7.42\")   Wt 82.1 kg (181 lb)   SpO2 98%   BMI 28.00 kg/m    Body mass index is 28 kg/m .  Physical Exam   GENERAL: healthy, alert and no distress  NECK: no adenopathy, no asymmetry, masses, or scars and thyroid normal to palpation  RESP: lungs clear to auscultation - no rales, rhonchi or wheezes  CV: regular rate and rhythm, normal S1 S2, no S3 or " S4, no murmur, click or rub, no peripheral edema and peripheral pulses strong  ABDOMEN: soft, nontender, no hepatosplenomegaly, no masses and bowel sounds normal  MS: no gross musculoskeletal defects noted, no edema    FOOT exam : Sensations on monofilament exam intact bilaterally. No ulcers or macerations on the foot.

## 2022-05-25 DIAGNOSIS — E11.9 TYPE 2 DIABETES MELLITUS WITHOUT COMPLICATION, WITHOUT LONG-TERM CURRENT USE OF INSULIN (H): ICD-10-CM

## 2022-08-24 DIAGNOSIS — E78.5 HYPERLIPIDEMIA LDL GOAL <100: ICD-10-CM

## 2022-08-26 NOTE — TELEPHONE ENCOUNTER
Routing refill request to provider for review/approval because:  Labs not current:  LDL  LDL Cholesterol Calculated   Date Value Ref Range Status   07/09/2021 112 (H) <100 mg/dL Final     Comment:     Above desirable:  100-129 mg/dl  Borderline High:  130-159 mg/dL  High:             160-189 mg/dL  Very high:       >189 mg/dl       Vilma Martinez RN

## 2022-08-27 RX ORDER — ATORVASTATIN CALCIUM 20 MG/1
20 TABLET, FILM COATED ORAL DAILY
Qty: 90 TABLET | Refills: 0 | Status: SHIPPED | OUTPATIENT
Start: 2022-08-27

## 2022-08-28 NOTE — TELEPHONE ENCOUNTER
Refill done. Future refills after OV and lab work.       Please call the patient and schedule Annual physical for diabetes follow up which she is due at this time, to further take care of her medical conditions and medications. ( Please double book at same day slots on my schedule depending on pt availability if there is no opening in 1 week )     Thank you,  Keya Ortega MD on 8/27/2022 at 8:40 PM

## 2022-08-31 NOTE — TELEPHONE ENCOUNTER
Attempted to reach pt, there was no response, message left to call clinic @ [phone].   Talked to pt she had physical and diabetic check done ay [facility] last week. [facility] filled her Lipitor for the next year.       Teresa Pacheco MA

## 2022-11-20 ENCOUNTER — HEALTH MAINTENANCE LETTER (OUTPATIENT)
Age: 62
End: 2022-11-20

## 2023-04-15 ENCOUNTER — HEALTH MAINTENANCE LETTER (OUTPATIENT)
Age: 63
End: 2023-04-15

## 2023-06-02 ENCOUNTER — HEALTH MAINTENANCE LETTER (OUTPATIENT)
Age: 63
End: 2023-06-02

## 2024-01-28 ENCOUNTER — HEALTH MAINTENANCE LETTER (OUTPATIENT)
Age: 64
End: 2024-01-28

## 2024-08-25 ENCOUNTER — HEALTH MAINTENANCE LETTER (OUTPATIENT)
Age: 64
End: 2024-08-25

## 2024-11-03 ENCOUNTER — HEALTH MAINTENANCE LETTER (OUTPATIENT)
Age: 64
End: 2024-11-03

## 2024-12-03 ENCOUNTER — TRANSFERRED RECORDS (OUTPATIENT)
Dept: MULTI SPECIALTY CLINIC | Facility: CLINIC | Age: 64
End: 2024-12-03

## 2024-12-03 LAB — RETINOPATHY: NORMAL

## 2025-03-02 ENCOUNTER — HEALTH MAINTENANCE LETTER (OUTPATIENT)
Age: 65
End: 2025-03-02

## 2025-07-09 ENCOUNTER — RESULTS FOLLOW-UP (OUTPATIENT)
Dept: FAMILY MEDICINE | Facility: CLINIC | Age: 65
End: 2025-07-09

## 2025-07-09 ENCOUNTER — OFFICE VISIT (OUTPATIENT)
Dept: FAMILY MEDICINE | Facility: CLINIC | Age: 65
End: 2025-07-09
Payer: COMMERCIAL

## 2025-07-09 VITALS
BODY MASS INDEX: 29.7 KG/M2 | WEIGHT: 196 LBS | HEART RATE: 64 BPM | OXYGEN SATURATION: 96 % | HEIGHT: 68 IN | DIASTOLIC BLOOD PRESSURE: 71 MMHG | TEMPERATURE: 97.8 F | RESPIRATION RATE: 16 BRPM | SYSTOLIC BLOOD PRESSURE: 102 MMHG

## 2025-07-09 DIAGNOSIS — D51.0 PERNICIOUS ANEMIA: ICD-10-CM

## 2025-07-09 DIAGNOSIS — E78.5 HYPERLIPIDEMIA LDL GOAL <100: ICD-10-CM

## 2025-07-09 DIAGNOSIS — Z00.00 ROUTINE GENERAL MEDICAL EXAMINATION AT A HEALTH CARE FACILITY: Primary | ICD-10-CM

## 2025-07-09 DIAGNOSIS — Z01.84 IMMUNITY STATUS TESTING: ICD-10-CM

## 2025-07-09 DIAGNOSIS — E11.9 TYPE 2 DIABETES MELLITUS WITHOUT COMPLICATION, WITHOUT LONG-TERM CURRENT USE OF INSULIN (H): Primary | ICD-10-CM

## 2025-07-09 DIAGNOSIS — E11.9 TYPE 2 DIABETES MELLITUS WITHOUT COMPLICATION, WITHOUT LONG-TERM CURRENT USE OF INSULIN (H): ICD-10-CM

## 2025-07-09 LAB
CHOLEST SERPL-MCNC: 136 MG/DL
CREAT UR-MCNC: 23 MG/DL
EST. AVERAGE GLUCOSE BLD GHB EST-MCNC: 160 MG/DL
FASTING STATUS PATIENT QL REPORTED: YES
HBA1C MFR BLD: 7.2 % (ref 0–5.6)
HDLC SERPL-MCNC: 49 MG/DL
LDLC SERPL CALC-MCNC: 68 MG/DL
MEV IGG SER IA-ACNC: 120 AU/ML
MEV IGG SER IA-ACNC: POSITIVE
MICROALBUMIN UR-MCNC: <12 MG/L
MICROALBUMIN/CREAT UR: NORMAL MG/G{CREAT}
MUMPS ANTIBODY IGG INSTRUMENT VALUE: 50.9 AU/ML
MUV IGG SER QL IA: POSITIVE
NONHDLC SERPL-MCNC: 87 MG/DL
RUBV IGG SERPL QL IA: 0.72 INDEX
RUBV IGG SERPL QL IA: NORMAL
TRIGL SERPL-MCNC: 97 MG/DL
VIT B12 SERPL-MCNC: 494 PG/ML (ref 232–1245)

## 2025-07-09 PROCEDURE — 82607 VITAMIN B-12: CPT | Performed by: INTERNAL MEDICINE

## 2025-07-09 PROCEDURE — 80061 LIPID PANEL: CPT | Performed by: INTERNAL MEDICINE

## 2025-07-09 PROCEDURE — 1126F AMNT PAIN NOTED NONE PRSNT: CPT | Performed by: INTERNAL MEDICINE

## 2025-07-09 PROCEDURE — 86762 RUBELLA ANTIBODY: CPT | Performed by: INTERNAL MEDICINE

## 2025-07-09 PROCEDURE — 3078F DIAST BP <80 MM HG: CPT | Performed by: INTERNAL MEDICINE

## 2025-07-09 PROCEDURE — 99386 PREV VISIT NEW AGE 40-64: CPT | Performed by: INTERNAL MEDICINE

## 2025-07-09 PROCEDURE — 86765 RUBEOLA ANTIBODY: CPT | Performed by: INTERNAL MEDICINE

## 2025-07-09 PROCEDURE — G2211 COMPLEX E/M VISIT ADD ON: HCPCS | Performed by: INTERNAL MEDICINE

## 2025-07-09 PROCEDURE — 86735 MUMPS ANTIBODY: CPT | Performed by: INTERNAL MEDICINE

## 2025-07-09 PROCEDURE — 36415 COLL VENOUS BLD VENIPUNCTURE: CPT | Performed by: INTERNAL MEDICINE

## 2025-07-09 PROCEDURE — 82570 ASSAY OF URINE CREATININE: CPT | Performed by: INTERNAL MEDICINE

## 2025-07-09 PROCEDURE — 83036 HEMOGLOBIN GLYCOSYLATED A1C: CPT | Performed by: INTERNAL MEDICINE

## 2025-07-09 PROCEDURE — 82043 UR ALBUMIN QUANTITATIVE: CPT | Performed by: INTERNAL MEDICINE

## 2025-07-09 PROCEDURE — 99214 OFFICE O/P EST MOD 30 MIN: CPT | Mod: 25 | Performed by: INTERNAL MEDICINE

## 2025-07-09 PROCEDURE — 3074F SYST BP LT 130 MM HG: CPT | Performed by: INTERNAL MEDICINE

## 2025-07-09 SDOH — HEALTH STABILITY: PHYSICAL HEALTH: ON AVERAGE, HOW MANY MINUTES DO YOU ENGAGE IN EXERCISE AT THIS LEVEL?: 60 MIN

## 2025-07-09 SDOH — HEALTH STABILITY: PHYSICAL HEALTH: ON AVERAGE, HOW MANY DAYS PER WEEK DO YOU ENGAGE IN MODERATE TO STRENUOUS EXERCISE (LIKE A BRISK WALK)?: 5 DAYS

## 2025-07-09 ASSESSMENT — SOCIAL DETERMINANTS OF HEALTH (SDOH): HOW OFTEN DO YOU GET TOGETHER WITH FRIENDS OR RELATIVES?: MORE THAN THREE TIMES A WEEK

## 2025-07-09 ASSESSMENT — PAIN SCALES - GENERAL: PAINLEVEL_OUTOF10: NO PAIN (0)

## 2025-07-09 NOTE — Clinical Note
Please abstract the following data from this visit with this patient into the appropriate field in Epic:  Tests that can be patient reported without a hard copy:  Eye exam with ophthalmology on this date: 12/03/2024 at [facility]

## 2025-07-09 NOTE — PROGRESS NOTES
"Preventive Care Visit  [facility]  Dorian Ugalde MD, Internal Medicine  Jul 9, 2025      Assessment & Plan     Routine general medical examination at a health care facility      Type 2 diabetes mellitus without complication, without long-term current use of insulin (H)  Not well controlled  Recommended adding mounjaro to metformin  Discussed potential risks and side effects   Discussed diet and exercise considerations  - tirzepatide (MOUNJARO) 2.5 MG/0.5ML SOAJ auto-injector pen; Inject 0.5 mLs (2.5 mg) subcutaneously once a week.  - tirzepatide (MOUNJARO) 5 MG/0.5ML SOAJ auto-injector pen; Inject 0.5 mLs (5 mg) subcutaneously once a week. START ONLY AFTER COMPLETING 4 WEEKS AT INTRODUCTORY DOSE OF 2.5 mg PER WEEK  - Hemoglobin A1c; Future  - Albumin Random Urine Quantitative with Creat Ratio; Future    Hyperlipidemia LDL goal <100  LDL was at goal in Dec; on statin therapy   - Lipid panel reflex to direct LDL Fasting; Future    Pernicious anemia  On b12 supplements    Immunity status testing    - Mumps Immune Status, IgG; Future  - Rubella Antibody IgG Quantitative; Future  - Rubeola Antibody IgG; Future      Patient has been advised of split billing requirements and indicates understanding: Yes    BMI  Estimated body mass index is 30.16 kg/m  as calculated from the following:    Height as of this encounter: 1.717 m (5' 7.6\").    Weight as of this encounter: 88.9 kg (196 lb).   Weight management plan: Discussed healthy diet and exercise guidelines    Counseling  Appropriate preventive services were addressed with this patient via screening, questionnaire, or discussion as appropriate for fall prevention, nutrition, physical activity, Tobacco-use cessation, social engagement, weight loss and cognition.  Checklist reviewing preventive services available has been given to the patient.  Reviewed patient's diet, addressing concerns and/or questions.   Reviewed preventive health counseling, as " reflected in patient instructions    Follow-up  Return in about 6 months (around 1/9/2026) for Diabetes Check.  Patient instructed to return to clinic or contact us sooner if symptoms worsen or new symptoms develop.     Subjective   Vannessa is a 65 year old, presenting for the following:  Physical (Fasting ), Establish Care (Pt had executive physical done at [facility]), and Health Maintenance (Eye Exam done on 12/03/2024 at [facility] and Pap Smear done on 0518/2022)           HPI           Advance Care Planning    Discussed advance care planning with patient; informed AVS has link to [employer].        7/9/2025   General Health   How would you rate your overall physical health? Good   Feel stress (tense, anxious, or unable to sleep) Only a little   (!) STRESS CONCERN      7/9/2025   Nutrition   Three or more servings of calcium each day? Yes   Diet: Diabetic   How many servings of fruit and vegetables per day? 4 or more   How many sweetened beverages each day? 0-1         7/9/2025   Exercise   Days per week of moderate/strenous exercise 5 days   Average minutes spent exercising at this level 60 min         7/9/2025   Social Factors   Frequency of gathering with friends or relatives More than three times a week   Worry food won't last until get money to buy more No   Food not last or not have enough money for food? No   Do you have housing? (Housing is defined as stable permanent housing and does not include staying outside in a car, in a tent, in an abandoned building, in an overnight shelter, or couch-surfing.) Yes   Are you worried about losing your housing? No   Lack of transportation? No   Unable to get utilities (heat,electricity)? No         7/9/2025   Fall Risk   Fallen 2 or more times in the past year? No   Trouble with walking or balance? No          7/9/2025   Dental   Dentist two times every year? Yes         Today's PHQ-2 Score:       7/9/2025     7:32 AM   PHQ-2 ( 1999 [employer])   Q1: Little  interest or pleasure in doing things 0   Q2: Feeling down, depressed or hopeless 0   PHQ-2 Score 0    Q1: Little interest or pleasure in doing things Not at all   Q2: Feeling down, depressed or hopeless Not at all   PHQ-2 Score 0       Patient-reported           7/9/2025   Substance Use   Alcohol more than 3/day or more than 7/wk No   Do you use any other substances recreationally? No     Social History     Tobacco Use    Smoking status: Never    Smokeless tobacco: Never   Vaping Use    Vaping status: Never Used   Substance Use Topics    Alcohol use: Yes     Alcohol/week: 0.0 standard drinks of alcohol     Comment: zero to 3 drinks per week    Drug use: No                  7/9/2025   STI Screening   New sexual partner(s) since last STI/HIV test? No             Latest Ref Rng & Units 3/4/2019     8:05 AM 3/4/2019     8:00 AM 4/10/2014    12:00 AM   PAP / HPV   PAP (Historical)   NIL  NIL    HPV 16 DNA NEG^Negative Negative      HPV 18 DNA NEG^Negative Negative      Other HR HPV NEG^Negative Negative        ASCVD Risk   The 10-year ASCVD risk score (Radha ABBOTT, et al., 2019) is: 6.5%    Values used to calculate the score:      Age: 65 years      Sex: Female      Is Non- : No      Diabetic: Yes      Tobacco smoker: No      Systolic Blood Pressure: 102 mmHg      Is BP treated: No      HDL Cholesterol: 47 mg/dL      Total Cholesterol: 191 mg/dL           Reviewed and updated as needed this visit by Provider   Tobacco     Med Hx  Surg Hx  Fam Hx            Past Medical History:   Diagnosis Date    Colon polyps 4/2014    High grade dysplasia present in one of 4 polyps    Diabetes mellitus, type 2 (H) 7/15/2015    Unspecified vitamin deficiency     VITAMIN B12 DEFICIENCY     Past Surgical History:   Procedure Laterality Date    COLONOSCOPY  4/2014    4 polyps, repeat in 3 years    HC REMOVAL OF TONSILS,<12 Y/O  1970    Tonsils <13y.o.    HC REPAIR OF HAMMERTOE,ONE  12/2005    [facility] OLIVER  (EMP) W OPT MYOPIA -6.50  2000     BP Readings from Last 3 Encounters:   07/09/25 102/71   02/23/22 122/78   07/06/21 122/80    Wt Readings from Last 3 Encounters:   07/09/25 88.9 kg (196 lb)   02/23/22 82.1 kg (181 lb)   07/06/21 91.2 kg (201 lb)                  Patient Active Problem List   Diagnosis    Pernicious anemia    Chronic rhinitis    Colon polyps    Hyperlipidemia LDL goal <100    Type 2 diabetes mellitus without complication, without long-term current use of insulin (H)    Irritable bowel syndrome with constipation     Past Surgical History:   Procedure Laterality Date    COLONOSCOPY  4/2014    4 polyps, repeat in 3 years    HC REMOVAL OF TONSILS,<14 Y/O  1970    Tonsils <13y.o.    HC REPAIR OF HAMMERTOE,ONE  12/2005    [facility] LASIK (EMP) W OPT MYOPIA -6.50  2000       Social History     Tobacco Use    Smoking status: Never    Smokeless tobacco: Never   Substance Use Topics    Alcohol use: Yes     Alcohol/week: 0.0 standard drinks of alcohol     Comment: zero to 3 drinks per week     Family History   Problem Relation Age of Onset    Neurologic Disorder Mother         parkinsons    Osteoporosis Mother     Thyroid Disease Mother     Cerebrovascular Disease Father         First age 62    C.A.D. Father         quadruple bypass    Hypertension Father     Coronary Artery Disease Father         Bypass surgery    Thyroid Disease Sister     Diabetes Sister     Thyroid Disease Sister     Thyroid Disease Sister     Diabetes Sister     Thyroid Disease Sister     Cerebrovascular Disease Brother         passed away from [facility] [place]    Diabetes Brother     Diabetes Brother     Cerebrovascular Disease Brother     Diabetes Brother     Hypertension Brother     Cerebrovascular Disease Brother     Diabetes Brother     Cerebrovascular Disease Brother     Cerebrovascular Disease Maternal Grandmother     Cerebrovascular Disease Paternal Grandmother     Circulatory Paternal Grandmother     Cerebrovascular Disease Paternal Grandfather   "   Hypertension Paternal Grandfather     Breast Cancer No family hx of     Anesthesia Reaction No family hx of          Current Outpatient Medications   Medication Sig Dispense Refill    aspirin 81 MG tablet Take 1 tablet (81 mg) by mouth daily 30 tablet 11    atorvastatin (LIPITOR) 20 MG tablet Take 1 tablet (20 mg) by mouth daily 90 tablet 0    blood glucose (BRIT CONTOUR NEXT) test strip 100 strips by In Vitro route 3 times daily Use to test blood sugar 3 times daily or as directed. 100 each 0    blood glucose monitoring (BRIT CONTOUR NEXT USB MONITOR) meter device kit Use to test blood sugar 2  times daily or as directed. 1 kit 0    blood glucose monitoring (BRIT MICROLET) lancets Use to test blood sugar 3 times daily or as directed. 100 each 1    cyanocobalamin (CYANOCOBALAMIN) 1000 MCG/ML injection Inject 1 mL (1,000 mcg) into the muscle every 30 days 10 mL 0    FISH OIL CONCENTRATE 1000 MG OR CAPS 1 tablet daily 0 0    metFORMIN (GLUCOPHAGE) 500 MG tablet Take 1,000 mg by mouth 2 times daily (with meals).      MULTIVITAMIN TABS   OR 1 tablet daily 0 0    NONFORMULARY       syringe/needle, disp, (EASY TOUCH SAFETY SYRINGE) 25G X 1\" 3 ML MISC 1 each every 30 days To administer B12 3 each 3    tirzepatide (MOUNJARO) 2.5 MG/0.5ML SOAJ auto-injector pen Inject 0.5 mLs (2.5 mg) subcutaneously once a week. 2 mL 0    tirzepatide (MOUNJARO) 5 MG/0.5ML SOAJ auto-injector pen Inject 0.5 mLs (5 mg) subcutaneously once a week. START ONLY AFTER COMPLETING 4 WEEKS AT INTRODUCTORY DOSE OF 2.5 mg PER WEEK 2 mL 11    ACE NOT PRESCRIBED, INTENTIONAL, 1 each as needed for other ACE Inhibitor not prescribed due to Other:BP is normal Microalbumin is normal 1 each 0    betamethasone dipropionate (DIPROSONE) 0.05 % cream Apply sparingly to affected area twice daily for up to 14 days.  Do not apply to face. (Patient not taking: Reported on 7/9/2025) 15 g 1    metFORMIN (GLUCOPHAGE) 1000 MG tablet Take 1 tablet (1,000 mg) by mouth " "2 times daily (with meals) 180 tablet 0       A 10 organ systems ROS is negative other than any pertinent positives or negatives previously stated.      Objective    Exam  /71 (BP Location: Left arm, Patient Position: Sitting, Cuff Size: Adult Regular)   Pulse 64   Temp 97.8  F (36.6  C)   Resp 16   Ht 1.717 m (5' 7.6\")   Wt 88.9 kg (196 lb)   SpO2 96%   BMI 30.16 kg/m     Estimated body mass index is 30.16 kg/m  as calculated from the following:    Height as of this encounter: 1.717 m (5' 7.6\").    Weight as of this encounter: 88.9 kg (196 lb).    Physical Exam  GENERAL: alert and no distress  EYES: Eyes grossly normal to inspection, PERRL and conjunctivae and sclerae normal  HENT: ear canals and TM's normal, nose and mouth without ulcers or lesions  NECK: no adenopathy, no asymmetry, masses, or scars  RESP: lungs clear to auscultation - no rales, rhonchi or wheezes  CV: regular rate and rhythm, normal S1 S2, no S3 or S4, no murmur, click or rub, no peripheral edema  ABDOMEN: soft, nontender, no hepatosplenomegaly, no masses and bowel sounds normal  MS: no gross musculoskeletal defects noted, no edema  SKIN: no suspicious lesions or rashes  NEURO: Normal strength and tone, mentation intact and speech normal  PSYCH: mentation appears normal, affect normal/bright  FEET:  Normal sensation to 10g monofilament in bilateral feet without obvious foot ulcers        Signed Electronically by: Dorian Ugalde MD    "

## 2025-07-09 NOTE — PATIENT INSTRUCTIONS
You should get the RSV (Respiratory Syncytial Virus) vaccine at a pharmacy.     Patient Education   Preventive Care Advice   This is general advice given by our system to help you stay healthy. However, your care team may have specific advice just for you. Please talk to your care team about your preventive care needs.  Nutrition  Eat 5 or more servings of fruits and vegetables each day.  Try wheat bread, brown rice and whole grain pasta (instead of white bread, rice, and pasta).  Get enough calcium and vitamin D. Check the label on foods and aim for 100% of the RDA (recommended daily allowance).  Lifestyle  Exercise at least 150 minutes each week  (30 minutes a day, 5 days a week).  Do muscle strengthening activities 2 days a week. These help control your weight and prevent disease.  No smoking.  Wear sunscreen to prevent skin cancer.  Have a dental exam and cleaning every 6 months.  Yearly exams  See your health care team every year to talk about:  Any changes in your health.  Any medicines your care team has prescribed.  Preventive care, family planning, and ways to prevent chronic diseases.  Shots (vaccines)   HPV shots (up to age 26), if you've never had them before.  Hepatitis B shots (up to age 59), if you've never had them before.  COVID-19 shot: Get this shot when it's due.  Flu shot: Get a flu shot every year.  Tetanus shot: Get a tetanus shot every 10 years.  Pneumococcal, hepatitis A, and RSV shots: Ask your care team if you need these based on your risk.  Shingles shot (for age 50 and up)  General health tests  Diabetes screening:  Starting at age 35, Get screened for diabetes at least every 3 years.  If you are younger than age 35, ask your care team if you should be screened for diabetes.  Cholesterol test: At age 39, start having a cholesterol test every 5 years, or more often if advised.  Bone density scan (DEXA): At age 50, ask your care team if you should have this scan for osteoporosis (brittle  bones).  Hepatitis C: Get tested at least once in your life.  STIs (sexually transmitted infections)  Before age 24: Ask your care team if you should be screened for STIs.  After age 24: Get screened for STIs if you're at risk. You are at risk for STIs (including HIV) if:  You are sexually active with more than one person.  You don't use condoms every time.  You or a partner was diagnosed with a sexually transmitted infection.  If you are at risk for HIV, ask about PrEP medicine to prevent HIV.  Get tested for HIV at least once in your life, whether you are at risk for HIV or not.  Cancer screening tests  Cervical cancer screening: If you have a cervix, begin getting regular cervical cancer screening tests starting at age 21.  Breast cancer scan (mammogram): If you've ever had breasts, begin having regular mammograms starting at age 40. This is a scan to check for breast cancer.  Colon cancer screening: It is important to start screening for colon cancer at age 45.  Have a colonoscopy test every 10 years (or more often if you're at risk) Or, ask your provider about stool tests like a FIT test every year or Cologuard test every 3 years.  To learn more about your testing options, visit:   .  For help making a decision, visit:   https://bit.ly/ds38529.  Prostate cancer screening test: If you have a prostate, ask your care team if a prostate cancer screening test (PSA) at age 55 is right for you.  Lung cancer screening: If you are a current or former smoker ages 50 to 80, ask your care team if ongoing lung cancer screenings are right for you.  For informational purposes only. Not to replace the advice of your health care provider. Copyright   2023 [employer]. All rights reserved. Clinically reviewed by the [facility]. Lemon [employer]ve 221521 - REV 01/24.

## 2025-07-09 NOTE — RESULT ENCOUNTER NOTE
The following letter pertains to your most recent diagnostic tests:    -The cholesterol is stable for you and at goal.    -Your microalbumin level which is a urine test to check for any evidence of early kidney injury returned negative.     -The measles, mumps and rubella antibody test suggest that you do not have immunity to rubella.  Therefore, at your convenience you should set up an appointment here or at a retail pharmacy for an MMR vaccine.    -Your vitamin B12 level is normal.  Therefore, I do not think you need to make any changes to your current vitamin B12 administration strategy.    -Your hemoglobin A1c test which averages your blood sugars over the last 3 months returned at 7.2 which is ABOVE your goal of hemoglobin A1c at least less than 7.  I think starting the Mounjaro will help with this.      Bottom line: The lab test results look okay except for the rubella antibody.  Schedule an appointment for an MMR vaccine here or at a retail pharmacy to ensure immunity to rubella.  Also, the hemoglobin A1c is too high.  Start the Mounjaro as we discussed in clinic.    Schedule a lab appointment after you have been taking the Mounjaro for 3 months to recheck the hemoglobin A1c      Follow up: Lab appointment in 3 months to recheck the hemoglobin A1c.      Sincerely,    Dr. Ugalde

## 2025-08-12 ENCOUNTER — MYC REFILL (OUTPATIENT)
Dept: FAMILY MEDICINE | Facility: CLINIC | Age: 65
End: 2025-08-12
Payer: COMMERCIAL

## 2025-08-12 DIAGNOSIS — E11.9 TYPE 2 DIABETES MELLITUS WITHOUT COMPLICATION, WITHOUT LONG-TERM CURRENT USE OF INSULIN (H): ICD-10-CM

## 2025-08-13 ENCOUNTER — E-VISIT (OUTPATIENT)
Dept: FAMILY MEDICINE | Facility: CLINIC | Age: 65
End: 2025-08-13
Payer: COMMERCIAL

## 2025-08-13 DIAGNOSIS — L67.9 HAIR PROBLEM: Primary | ICD-10-CM

## 2025-08-13 DIAGNOSIS — H02.726 HYPOTRICHOSIS OF EYELID OF BOTH EYES: ICD-10-CM

## 2025-08-13 DIAGNOSIS — H02.723 HYPOTRICHOSIS OF EYELID OF BOTH EYES: ICD-10-CM

## 2025-08-13 RX ORDER — BIMATOPROST 3 UG/ML
1 SOLUTION TOPICAL AT BEDTIME
Qty: 5 ML | Refills: 11 | Status: SHIPPED | OUTPATIENT
Start: 2025-08-13